# Patient Record
Sex: MALE | Race: OTHER | Employment: STUDENT | URBAN - METROPOLITAN AREA
[De-identification: names, ages, dates, MRNs, and addresses within clinical notes are randomized per-mention and may not be internally consistent; named-entity substitution may affect disease eponyms.]

---
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
## 2021-01-06 ENCOUNTER — TELEPHONE (OUTPATIENT)
Dept: ORTHOPEDIC SURGERY | Age: 21
End: 2021-01-06

## 2021-01-06 ENCOUNTER — APPOINTMENT (OUTPATIENT)
Dept: ULTRASOUND IMAGING | Age: 21
End: 2021-01-06
Payer: MEDICAID

## 2021-01-06 ENCOUNTER — HOSPITAL ENCOUNTER (OUTPATIENT)
Dept: WOUND CARE | Age: 21
Discharge: HOME | End: 2021-01-06
Payer: MEDICAID

## 2021-01-06 ENCOUNTER — HOSPITAL ENCOUNTER (EMERGENCY)
Age: 21
Discharge: HOME | End: 2021-01-06
Payer: MEDICAID

## 2021-01-06 VITALS
HEART RATE: 99 BPM | TEMPERATURE: 97.3 F | HEIGHT: 72 IN | OXYGEN SATURATION: 98 % | WEIGHT: 200 LBS | BODY MASS INDEX: 27.09 KG/M2 | RESPIRATION RATE: 16 BRPM | SYSTOLIC BLOOD PRESSURE: 125 MMHG | DIASTOLIC BLOOD PRESSURE: 86 MMHG

## 2021-01-06 VITALS
HEIGHT: 72 IN | WEIGHT: 197 LBS | HEART RATE: 84 BPM | BODY MASS INDEX: 26.68 KG/M2 | RESPIRATION RATE: 20 BRPM | TEMPERATURE: 97.4 F | SYSTOLIC BLOOD PRESSURE: 130 MMHG | DIASTOLIC BLOOD PRESSURE: 82 MMHG

## 2021-01-06 DIAGNOSIS — R30.0 DYSURIA: ICD-10-CM

## 2021-01-06 DIAGNOSIS — L98.492 ULCER OF ABDOMEN WALL WITH FAT LAYER EXPOSED (HCC): ICD-10-CM

## 2021-01-06 DIAGNOSIS — T86.828 SKIN FLAP NECROSIS (HCC): Chronic | ICD-10-CM

## 2021-01-06 DIAGNOSIS — T81.32XS DISRUPTION OR DEHISCENCE OF CLOSURE OF MUSCLE OR MUSCLE FLAP, SEQUELA: Primary | Chronic | ICD-10-CM

## 2021-01-06 DIAGNOSIS — N50.82 SCROTAL PAIN: Primary | ICD-10-CM

## 2021-01-06 DIAGNOSIS — I96 SKIN FLAP NECROSIS (HCC): Chronic | ICD-10-CM

## 2021-01-06 LAB
ALBUMIN SERPL-MCNC: 4.4 G/DL (ref 3.5–5.2)
ALP BLD-CCNC: 122 U/L (ref 40–129)
ALT SERPL-CCNC: 14 U/L (ref 0–40)
ANION GAP SERPL CALCULATED.3IONS-SCNC: 11 MMOL/L (ref 7–16)
AST SERPL-CCNC: 11 U/L (ref 0–39)
BASOPHILS ABSOLUTE: 0.04 E9/L (ref 0–0.2)
BASOPHILS RELATIVE PERCENT: 0.7 % (ref 0–2)
BILIRUB SERPL-MCNC: 0.2 MG/DL (ref 0–1.2)
BILIRUBIN URINE: NEGATIVE
BLOOD, URINE: NEGATIVE
BUN BLDV-MCNC: 10 MG/DL (ref 6–20)
CALCIUM SERPL-MCNC: 11 MG/DL (ref 8.6–10.2)
CHLORIDE BLD-SCNC: 101 MMOL/L (ref 98–107)
CLARITY: CLEAR
CO2: 25 MMOL/L (ref 22–29)
COLOR: YELLOW
CREAT SERPL-MCNC: 0.8 MG/DL (ref 0.7–1.2)
EOSINOPHILS ABSOLUTE: 0.17 E9/L (ref 0.05–0.5)
EOSINOPHILS RELATIVE PERCENT: 3 % (ref 0–6)
GFR AFRICAN AMERICAN: >60
GFR NON-AFRICAN AMERICAN: >60 ML/MIN/1.73
GLUCOSE BLD-MCNC: 89 MG/DL (ref 74–99)
GLUCOSE URINE: NEGATIVE MG/DL
HCT VFR BLD CALC: 36.6 % (ref 37–54)
HEMOGLOBIN: 12.5 G/DL (ref 12.5–16.5)
IMMATURE GRANULOCYTES #: 0.05 E9/L
IMMATURE GRANULOCYTES %: 0.9 % (ref 0–5)
KETONES, URINE: NEGATIVE MG/DL
LEUKOCYTE ESTERASE, URINE: NEGATIVE
LYMPHOCYTES ABSOLUTE: 2.13 E9/L (ref 1.5–4)
LYMPHOCYTES RELATIVE PERCENT: 38 % (ref 20–42)
MCH RBC QN AUTO: 29.8 PG (ref 26–35)
MCHC RBC AUTO-ENTMCNC: 34.2 % (ref 32–34.5)
MCV RBC AUTO: 87.4 FL (ref 80–99.9)
MONOCYTES ABSOLUTE: 0.52 E9/L (ref 0.1–0.95)
MONOCYTES RELATIVE PERCENT: 9.3 % (ref 2–12)
NEUTROPHILS ABSOLUTE: 2.69 E9/L (ref 1.8–7.3)
NEUTROPHILS RELATIVE PERCENT: 48.1 % (ref 43–80)
NITRITE, URINE: NEGATIVE
PDW BLD-RTO: 13.8 FL (ref 11.5–15)
PH UA: 5.5 (ref 5–9)
PLATELET # BLD: 263 E9/L (ref 130–450)
PMV BLD AUTO: 9 FL (ref 7–12)
POTASSIUM REFLEX MAGNESIUM: 4.2 MMOL/L (ref 3.5–5)
PROTEIN UA: NEGATIVE MG/DL
RBC # BLD: 4.19 E12/L (ref 3.8–5.8)
SODIUM BLD-SCNC: 137 MMOL/L (ref 132–146)
SPECIFIC GRAVITY UA: 1.02 (ref 1–1.03)
TOTAL PROTEIN: 7.1 G/DL (ref 6.4–8.3)
UROBILINOGEN, URINE: 0.2 E.U./DL
WBC # BLD: 5.6 E9/L (ref 4.5–11.5)

## 2021-01-06 PROCEDURE — 11042 DBRDMT SUBQ TIS 1ST 20SQCM/<: CPT

## 2021-01-06 PROCEDURE — 76870 US EXAM SCROTUM: CPT

## 2021-01-06 PROCEDURE — 87491 CHLMYD TRACH DNA AMP PROBE: CPT

## 2021-01-06 PROCEDURE — 93976 VASCULAR STUDY: CPT

## 2021-01-06 PROCEDURE — 11042 DBRDMT SUBQ TIS 1ST 20SQCM/<: CPT | Performed by: SURGERY

## 2021-01-06 PROCEDURE — 87088 URINE BACTERIA CULTURE: CPT

## 2021-01-06 PROCEDURE — 87591 N.GONORRHOEAE DNA AMP PROB: CPT

## 2021-01-06 PROCEDURE — 85025 COMPLETE CBC W/AUTO DIFF WBC: CPT

## 2021-01-06 PROCEDURE — 81003 URINALYSIS AUTO W/O SCOPE: CPT

## 2021-01-06 PROCEDURE — 99284 EMERGENCY DEPT VISIT MOD MDM: CPT

## 2021-01-06 PROCEDURE — 80053 COMPREHEN METABOLIC PANEL: CPT

## 2021-01-06 RX ORDER — LIDOCAINE HYDROCHLORIDE 40 MG/ML
SOLUTION TOPICAL ONCE
Status: DISCONTINUED | OUTPATIENT
Start: 2021-01-06 | End: 2021-01-07 | Stop reason: HOSPADM

## 2021-01-06 ASSESSMENT — PAIN SCALES - GENERAL: PAINLEVEL_OUTOF10: 7

## 2021-01-06 ASSESSMENT — PAIN DESCRIPTION - ORIENTATION: ORIENTATION: LEFT

## 2021-01-06 ASSESSMENT — PAIN DESCRIPTION - PAIN TYPE: TYPE: ACUTE PAIN

## 2021-01-06 ASSESSMENT — PAIN DESCRIPTION - LOCATION: LOCATION: SCROTUM

## 2021-01-06 NOTE — PROGRESS NOTES
[facility] Followup Visit Note    Referring Physician : MD Keegan Matthewspsy Jorge RECORD NUMBER:  [id number]  AGE: 24 y.o. GENDER: male  : 2000  EPISODE DATE:  2021    Subjective:     Chief Complaint   Patient presents with    Wound Check     abdomen/thigh      HISTORY of PRESENT ILLNESS HPI   Yogesh Aguilar is a 24 y.o. male who presents today in regards to follow up evaluation and treatment of wound/ulcer. That patient's past medical, family and social hx were reviewed and changes were made if present. History of Wound Context:  Patient presents in regards to chronic left lower extremity and abdominal wounds. He was a trauma and on admission underwent ex lap, control of mesenteric bleed with small bowel resection, gastrotomy. He also underwent multiple orthopaedic procedures, irrigation, debridements on adission. He had a traumatic amputation of his left leg, large traumatic lateral and medial thigh wound, a left open subtrochanteric femur fx and a left knee traumatic arthrotomy in addition to multiple other injuries. He  Eventually had IMN of the left femur and a complex secondary closure of his lateral left thigh wound, muscle flap. This muscle flap failed and he had a large open wound noted in lateral thigh which tracked underneath the left femur to the medial aspect of the left. He underwent hbo. He had skin graft by Dr. Mert Funes to left thigh with almost 100% take. 12/10/20 he had removal of his external fixator by Dr. Jann Juarez.       20  · Overall wound improving  · aquacell  ·  ordered through [facility]  21  · Wound improving  · Complaining of left scrotal pain for 1 week with dyuria - spoke with ER - he will go there to be evaluated    Wound/Ulcer Pain Timing/Severity: waxing and waning  Quality of pain: aching, shooting, tender  Severity:   10   Modifying Factors: Pain worsens with pressure, debridement  Associated Signs/Symptoms: drainage and pain    Ulcer Identification:  Ulcer Type: non-healing surgical and traumatic  Contributing Factors: decreased mobility    Diabetic/Pressure/Non Pressure Ulcers only:  Ulcer: Non-Pressure ulcer, fat layer exposed    Wound: Dehiscence of traumatic injury wound repair        PAST MEDICAL HISTORY      Diagnosis Date    Disruption of closure of muscle or muscle flap 10/9/2020    Encounter regarding vascular access for dialysis for ESRD ([facility] [address] 9/15/2020    History of blood transfusion     S/P AKA (above knee amputation), left ([facility] [address] 12/30/2020    Skin flap necrosis ([facility] [address] 10/21/2020    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/6/2020     Past Surgical History:   Procedure Laterality Date    APPLY DRESSING Left 9/17/2020    LEFT LOWER EXTREMITY I & D performed by Suyapa Garcia MD at [facility] N/A 10/13/2020    TESIO CATHETER REMOVAL---PT ON SELECT performed by Tasha Lindsey MD at [address] [place] [address] Left 9/3/2020    INCISION AND DRAINAGE LEFT LOWER LEG WITH REVISION OF BELOW THE KNEE AMPUTATION AND APPLICATION OF WOUND VAC performed by Suyapa Garcia MD at [facility] [facility] N/A 9/15/2020    CATHETER INSERTION HEMODIALYSIS TUNNELED REMOVAL OF TEMP DIALYSIS CATH performed by Tasha Lindsey MD at [address] [place] [address] Right 9/28/2020    LEFT THIGH IRRIGATION AND DEBRIDEMENT, RIGHT SI SCREW, REVISION OF PELVIC EXTERNAL FIXATOR performed by Yaa Jensen DO at [address] [place] [address] Left 10/5/2020    IRRIGATION AND DEBRIDEMENT LEFT THIGH COMPLEX POST OP WOUND WITH APPLICATION WOUND VAC performed by Yaa Jensen DO at [address] [place] [address] Left 10/8/2020    IRRIGATION AND DEBRIDEMENT LEFT THIGH WOUND GREATER THAN 20 SQUARE CM performed by Suyapa Garcia MD at [facility] 74 N/A 8/29/2020    exploratory laporotomy, small bowel resection, abdominal packing performed by Wayne Daniel MD at [facility] 9/3/2020    2nd LOOK LAPAROTOMY, WITH  ABDOMINAL WOUND CLOSURE performed by Andreea Jacobs MD at [address] [place] [address] 8/29/2020    TRAUMATIC LEFT LEG AMPUTATION BELOW KNEE, IRRIGATION AND DEBRIDEMENT LEFT KNEE, MEDIAL LEFT THIGH WOUND, PARTIAL AMPUTATION LEFT BKA, WOUND VAC APPLICATION LEFT KNEE, THIGH, TRACTION PIN LEFT FEMUR performed by Enriqueta Galeas MD at [address] [place] [address] 9/8/2020    THIGH IRRIGATION AND DEBRIDEMENT, APPLICATION WOUND VAC, IM NAIL LEFT FEMUR performed by Nalini Serrano MD at [facility] 10/20/2020    SKIN GRAFT SPLIT THICKNESS TO UPPER LEFT THIGH WOUND WITH APPLICATION OF WOUND VAC-----PT IN [facility] ----- performed by Naima Allred MD at [place] [address] N/A 9/10/2020    TRACHEOTOMY PERCUTANEOUS BRONCHOSCOPY performed by Mushtaq Lara MD at [facility] N/A 9/10/2020    EGD ESOPHAGOGASTRODUODENOSCOPY PEG TUBE INSERTION performed by Mushtaq Lara MD at [address] [place] [address]     History reviewed. No pertinent family history.   Social History     Tobacco Use    Smoking status: Never Smoker    Smokeless tobacco: Never Used   Substance Use Topics    Alcohol use: Never     Frequency: Never    Drug use: Never     No Known Allergies  Current Outpatient Medications on File Prior to Encounter   Medication Sig Dispense Refill    calcium acetate (PHOSLYRA) 667 MG/5ML SOLN Take 1,334 mg by mouth 3 times daily (with meals)      metoclopramide (REGLAN) 10 MG tablet Take 10 mg by mouth 4 times daily      vitamin D (CHOLECALCIFEROL) 14156 UNIT CAPS Take 50,000 Units by mouth once a week      magnesium oxide (MAG-OX) 400 MG tablet Take 800 mg by mouth 2 times daily       ferrous sulfate (FE TABS 325) 325 (65 Fe) MG EC tablet Take 325 mg by mouth 3 times daily (with meals)      fluticasone (FLONASE) 50 MCG/ACT nasal spray 1 spray by Each Nostril route daily      amitriptyline (ELAVIL) 25 MG tablet 25 mg by PEG Tube route nightly      methocarbamol (ROBAXIN) 500 MG tablet Take 1,000 mg by mouth 3 times daily       gabapentin (NEURONTIN) 100 MG capsule Take 1 capsule by mouth 3 times daily for 30 days. (Patient taking differently: 600 mg by PEG Tube route 3 times daily. ) 90 capsule 0    cloNIDine (CATAPRES) 0.1 MG tablet Take 1 tablet by mouth 2 times daily (Patient taking differently: 0.1 mg by PEG Tube route daily ) 60 tablet 3    Misc. Devices [facility]) MISC 20\" seat width. Diagnosis: traumatic amputation of left lower extremity, left femur fracture, pelvis fractures with disruption of pelvic ring 1 each 0    acetaminophen (TYLENOL) 325 MG tablet 650 mg by PEG Tube route every 6 hours as needed for Pain       No current facility-administered medications on file prior to encounter. REVIEW OF SYSTEMS See HPI    Objective:    /82   Pulse 84   Temp 97.4 °F (36.3 °C) (Temporal)   Resp 20   Ht 6' (1.829 m)   Wt 197 lb (89.4 kg)   BMI 26.72 kg/m²   Wt Readings from Last 3 Encounters:   01/06/21 197 lb (89.4 kg) (92 %, Z= 1.38)*   12/30/20 197 lb (89.4 kg) (92 %, Z= 1.39)*   10/05/20 (!) 236 lb 12.4 oz (107.4 kg) (99 %, Z= 2.21)*     * Growth percentiles are based on CDC (Boys, 2-20 Years) data.      PHYSICAL EXAM  CONSTITUTIONAL:   Awake, alert, cooperative   EYES:  lids and lashes normal   ENT: external ears and nose without lesions   NECK:  supple, symmetrical, trachea midline   SKIN:  Open wound present    Assessment:     Problem List Items Addressed This Visit     Disruption of closure of muscle or muscle flap - Primary (Chronic)    Skin flap necrosis (HCC) (Chronic)    Ulcer of abdomen wall with fat layer exposed ([address]          Pre Debridement Measurements:  Are located in the [place]  Documentation Flow Sheet  Post Debridement Measurements:  Wound/Ulcer Descriptions are Pre Debridement except measurements:     Negative Pressure Wound Therapy Leg Left;Upper; Lateral (Active)   Dressing Type Black foam 12/10/20 0820   Cycle Continuous 12/10/20 0820   Target Pressure (mmHg) 125 12/10/20 0820   Number of days: 92       Negative Pressure Wound Therapy Leg Anterior; Upper;Left (Active)   Number of days: 77       Wound 08/29/20 Thigh Left;Medial (Active)   Number of days: 130       Wound 08/29/20 Leg Left;Distal (Active)   Number of days: 130       Wound 09/03/20 Abdomen second distal (Active)   Number of days: 125       Wound 09/08/20 Abdomen most proximal (Active)   Number of days: 120       Wound 09/03/20 Abdomen second proximal (Active)   Number of days: 125       Wound 09/03/20 Abdomen third most proximal (Active)   Number of days: 125       Wound 09/03/20 Abdomen forth most proximal (Active)   Number of days: 125       Wound 09/03/20 Abdomen most distal (Active)   Number of days: 125       Wound 09/03/20 Abdomen third distal (Active)   Number of days: 125       Wound 09/03/20 Abdomen forth distal (Active)   Number of days: 125       Wound 09/10/20 Wrist Right;Medial (Active)   Number of days: 117       Wound 09/08/20 Leg Left;Distal;Upper (Active)   Number of days: 120       Wound 10/20/20 Thigh Left; Anterior SKIN GRAFT SITE (Active)   Number of days: 78       Wound 12/30/20 Thigh Left #1 (Active)   Wound Image   12/30/20 0807   Wound Etiology Traumatic 12/30/20 0807   Dressing/Treatment Collagen;Dry dressing 01/06/21 0844   Wound Length (cm) 1.7 cm 01/06/21 0803   Wound Width (cm) 0.4 cm 01/06/21 0803   Wound Depth (cm) 0.5 cm 01/06/21 0803   Wound Surface Area (cm^2) 0.68 cm^2 01/06/21 0803   Change in Wound Size % (l*w) 74.24 01/06/21 0803   Wound Volume (cm^3) 0.34 cm^3 01/06/21 0803   Wound Healing % 74 01/06/21 0803   Post-Procedure Length (cm) 1.9 cm 01/06/21 0844   Post-Procedure Width (cm) 0.7 cm 01/06/21 0844   Post-Procedure Depth (cm) 0.6 cm 01/06/21 0844   Post-Procedure Surface Area (cm^2) 1.33 cm^2 01/06/21 0844   Post-Procedure Volume (cm^3) 0.8 cm^3 01/06/21 0844   Wound Assessment Fibrin 01/06/21 0803   Drainage Amount Moderate 01/06/21 0803   Drainage Description Yellow;Brown 01/06/21 0803   Odor None 01/06/21 0803   Scarlet-wound Assessment Maceration 01/06/21 0803   Number of days: 7       Wound 12/30/20 Abdomen Lower;Medial #2 (Active)   Wound Image   12/30/20 0807   Wound Etiology Traumatic 12/30/20 0807   Dressing/Treatment Collagen;Dry dressing 01/06/21 0844   Wound Length (cm) 1.2 cm 01/06/21 0803   Wound Width (cm) 2.3 cm 01/06/21 0803   Wound Depth (cm) 0.2 cm 01/06/21 0803   Wound Surface Area (cm^2) 2.76 cm^2 01/06/21 0803   Change in Wound Size % (l*w) -6800 01/06/21 0803   Wound Volume (cm^3) 0.55 cm^3 01/06/21 0803   Wound Healing % -5400 01/06/21 0803   Post-Procedure Length (cm) 1.5 cm 01/06/21 0844   Post-Procedure Width (cm) 2.4 cm 01/06/21 0844   Post-Procedure Depth (cm) 0.2 cm 01/06/21 0844   Post-Procedure Surface Area (cm^2) 3.6 cm^2 01/06/21 0844   Post-Procedure Volume (cm^3) 0.72 cm^3 01/06/21 0844   Wound Assessment Pink/red;Fibrin 01/06/21 0803   Drainage Amount Moderate 01/06/21 0803   Drainage Description Yellow 01/06/21 0803   Odor None 01/06/21 0803   Scarlet-wound Assessment Intact 01/06/21 0803   Number of days: 7     Incision 08/29/20 Hip Left (Active)   Number of days: 129       Incision 09/03/20 Abdomen Mid (Active)   Number of days: 124       Incision 09/11/20 Thigh Left;Lateral (Active)   Number of days: 116       Incision 09/15/20 Chest Right;Upper (Active)   Number of days: 112       Incision 09/17/20 Thigh Left (Active)   Number of days: 110       Incision 09/28/20 Pelvis Lateral;Right;Left (Active)   Number of days: 100       Incision 09/28/20 Pelvis Anterior (Active)   Number of days: 100       Incision 09/28/20 Thigh Left (Active)   Number of days: 100       Incision 10/05/20 Thigh Left;Lateral (Active)   Number of days: 92       Incision 10/08/20 Thigh Left (Active)   Number of days: 89       Incision 10/20/20 Thigh Right; Anterior (Active)   Number of days: 78       Incision 12/10/20 Abdomen Left (Active)   Dressing Status Clean;Dry; Intact; New drainage noted 12/10/20 0930   Dressing/Treatment Dry dressing 12/10/20 0930   Drainage Amount Scant 12/10/20 0930   Drainage Description Serosanguinous 12/10/20 0930   Number of days: 27       Incision 12/10/20 Anterior;Right (Active)   Dressing Status Clean;Dry; Intact; New drainage noted 12/10/20 0930   Dressing/Treatment Dry dressing 12/10/20 0930   Drainage Amount Scant 12/10/20 0900   Drainage Description Serosanguinous 12/10/20 0900   Number of days: 27       Procedure Note  Indications:  Based on my examination of this patient's wound(s)/ulcer(s) today, debridement is required to promote healing and evaluate the wound base. Performed by: Maria Esther Bailey MD    Consent obtained:  Yes    Time out taken:  Yes    Pain Control: Anesthetic  Anesthetic: 4% Lidocaine Liquid Topical     Debridement:Excisional Debridement    Using curette the wound(s)/ulcer(s) was/were sharply debrided down through and including the removal of epidermis, dermis and subcutaneous tissue. Devitalized Tissue Debrided:  fibrin, biofilm, slough, necrotic/eschar and exudate to stimulate bleeding to promote healing, post debridement good bleeding base and wound edges noted    Wound/Ulcer #: 1 and 2    Percent of Wound/Ulcer Debrided: 100%    Total Surface Area Debrided: 3.3 sq cm     Estimated Blood Loss:  Minimal  Hemostasis Achieved:  by pressure    Procedural Pain:  4  / 10   Post Procedural Pain:  3 / 10     Response to treatment:  Well tolerated by patient. Plan:   Treatment Note please see attached Discharge Instructions    Written patient dismissal instructions given to patient and signed by patient or POA.          Discharge Instructions       Visit Discharge/Physician Orders     Discharge condition: Stable     Assessment of pain at discharge:no     Anesthetic used: 4% lidocaine soln.     Discharge to: Home     Left via:Private automobile     Accompanied by: cousin     ECF/HHA: n/a(supplies [facility])     Dressing Orders: LEFT POSTERIOR THIGH and ABDOMEN-Cleanse with normal saline, apply promogran(slightly moisten with normal saline),dry dressing and secure. Change daily.     Treatment Orders:Eat a diet high in protein and vitamin C. Take a multiple vitamin daily unless contraindicated.     Keep all pressure off of wound sites       orthotics consult was made by Dr Jc Seaman     [facility] followup visit : 1 week_____________________________  (Please note your next appointment above and if you are unable to keep, kindly give a 24 hour notice.  Thank you.)     Physician signature:__________________________     If you experience any of the following, please call the [address] [place] [address] during business hours:     * Increase in Pain  * Temperature over 101  * Increase in drainage from your wound  * Drainage with a foul odor  * Bleeding  * Increase in swelling  * Need for compression bandage changes due to slippage, breakthrough drainage.     If you need medical attention outside of the business hours of the [address] [place] [address] CLEAR please contact your PCP or go to the nearest emergency room.                 Electronically signed by Geovanni Lakhani MD on 1/6/2021 at 9:19 AM

## 2021-01-06 NOTE — TELEPHONE ENCOUNTER
This is done as of 8 am this morning  Electronically signed by Valentin Fuentes PA-C on 1/6/2021 at 1:31 PM

## 2021-01-06 NOTE — TELEPHONE ENCOUNTER
Received voicemail from Omayra @ [address] [facility] #[phone]. Order for wheelchair with elevated leg rests was ordered on 12- by Emely Garcia PA-C. Omyara is stating she has been unable to finish processing the request due to 12/30/2020 note needing signed in Epic (she did not say if it's the order for [facility], [facility] order or OV notes from 12- need signed). Asking for this to be signed so she can finish processing. No need to fax it since Omayra can pull it from [employer].

## 2021-01-06 NOTE — ED NOTES
FIRST PROVIDER CONTACT ASSESSMENT NOTE      Department of Emergency Medicine   1/6/21  9:30 AM EST    Chief Complaint: Groin Swelling (left testicle pain)      History of Present Illness:   Hazel Bower is a 24 y.o. male who presents to the ED for testicle pain for 1 week with difficulty urinating. Medical History:  has a past medical history of Disruption of closure of muscle or muscle flap, Encounter regarding vascular access for dialysis for ESRD ([facility] [address] History of blood transfusion, S/P AKA (above knee amputation), left ([address] Skin flap necrosis ([facility] [address] and Skin graft failure. Surgical History:  has a past surgical history that includes laparotomy (N/A, 8/29/2020); Leg amputation below knee (Left, 8/29/2020); laparotomy (N/A, 9/3/2020); Femur fracture surgery (Left, 9/3/2020); Leg amputation below knee (Left, 9/8/2020); Tracheal surgery (N/A, 9/10/2020); Upper gastrointestinal endoscopy (N/A, 9/10/2020); hc dialysis catheter (N/A, 9/15/2020); Apply dressing (Left, 9/17/2020); incision and drainage (Right, 9/28/2020); incision and drainage (Left, 10/5/2020); incision and drainage (Left, 10/8/2020); Catheter Removal (N/A, 10/13/2020); and Skin graft (Left, 10/20/2020). Social History:  reports that he has never smoked. He has never used smokeless tobacco. He reports that he does not drink alcohol or use drugs. Family History: family history is not on file. *ALLERGIES*     Patient has no known allergies.      Physical Exam:      VS:  /86   Pulse 99   Temp 97.3 °F (36.3 °C)   Resp 16   Ht 6' (1.829 m)   Wt 200 lb (90.7 kg)   SpO2 98%   BMI 27.12 kg/m²      Initial Plan of Care:  Initiate Treatment-Testing, Proceed toTreatment Area When Bed Available for ED Attending/MLP to Continue Care    -----------------[address] ASSESSMENT NOTE--------------  Electronically signed by Mardene Curling, APRN - CNP   DD: 1/6/21           Mardene Curling, APRN - CNP  01/06/21 0930

## 2021-01-07 NOTE — ED PROVIDER NOTES
tobacco. He reports that he does not drink alcohol or use drugs. Family History: family history is not on file. Allergies: Patient has no known allergies. Physical Exam   Oxygen Saturation Interpretation: Normal.        ED Triage Vitals   BP Temp Temp src Heart Rate Resp SpO2 Height Weight - Scale   01/06/21 0928 01/06/21 0909 -- 01/06/21 0909 01/06/21 0928 01/06/21 0909 01/06/21 0928 01/06/21 0928   125/86 97.3 °F (36.3 °C)  97 16 97 % 6' (1.829 m) 200 lb (90.7 kg)         Constitutional:  Alert, development consistent with age. Eyes:  PERRL, EOMI, no discharge or conjunctival injection. Ears:  External ears without lesions. Throat:  Pharynx without injection, exudate, or tonsillar hypertrophy. Airway patient. Neck:  Normal ROM. Supple. Respiratory:  Clear to auscultation and breath sounds equal.  CV:  Regular rate and rhythm, normal heart sounds, without pathological murmurs, ectopy, gallops, or rubs. GI:  General Appearance: normal.       Bowel sounds: normal bowel sounds. Distension:  None. Tenderness: No abdominal tenderness. Liver: non-tender. Spleen:  non-tender. Pulsatile Mass: absent. Hernia:  no inguinal or femoral hernias noted. : Chaperone present during exam       Penis: non focal circumcised. Scrotum: normal.         Testicle: normal without masses bilateral.  Integument:  Normal turgor. Warm, dry, without visible rash, unless noted elsewhere. Neurological:  Orientation age-appropriate. Motor functions intact.     Lab / Imaging Results   (All laboratory and radiology results have been personally reviewed by myself)  Labs:  Results for orders placed or performed during the hospital encounter of 01/06/21   C.trachomatis N.gonorrhoeae DNA, Urine    Specimen: Urine   Result Value Ref Range    Source Urine    CBC Auto Differential   Result Value Ref Range    WBC 5.6 4.5 - 11.5 E9/L    RBC 4.19 3.80 - 5.80 E12/L Hemoglobin 12.5 12.5 - 16.5 g/dL    Hematocrit 36.6 (L) 37.0 - 54.0 %    MCV 87.4 80.0 - 99.9 fL    MCH 29.8 26.0 - 35.0 pg    MCHC 34.2 32.0 - 34.5 %    RDW 13.8 11.5 - 15.0 fL    Platelets 079 760 - 663 E9/L    MPV 9.0 7.0 - 12.0 fL    Neutrophils % 48.1 43.0 - 80.0 %    Immature Granulocytes % 0.9 0.0 - 5.0 %    Lymphocytes % 38.0 20.0 - 42.0 %    Monocytes % 9.3 2.0 - 12.0 %    Eosinophils % 3.0 0.0 - 6.0 %    Basophils % 0.7 0.0 - 2.0 %    Neutrophils Absolute 2.69 1.80 - 7.30 E9/L    Immature Granulocytes # 0.05 E9/L    Lymphocytes Absolute 2.13 1.50 - 4.00 E9/L    Monocytes Absolute 0.52 0.10 - 0.95 E9/L    Eosinophils Absolute 0.17 0.05 - 0.50 E9/L    Basophils Absolute 0.04 0.00 - 0.20 E9/L   Comprehensive Metabolic Panel w/ Reflex to MG   Result Value Ref Range    Sodium 137 132 - 146 mmol/L    Potassium reflex Magnesium 4.2 3.5 - 5.0 mmol/L    Chloride 101 98 - 107 mmol/L    CO2 25 22 - 29 mmol/L    Anion Gap 11 7 - 16 mmol/L    Glucose 89 74 - 99 mg/dL    BUN 10 6 - 20 mg/dL    CREATININE 0.8 0.7 - 1.2 mg/dL    GFR Non-African American >60 >=60 mL/min/1.73    GFR African American >60     Calcium 11.0 (H) 8.6 - 10.2 mg/dL    Total Protein 7.1 6.4 - 8.3 g/dL    Alb 4.4 3.5 - 5.2 g/dL    Total Bilirubin 0.2 0.0 - 1.2 mg/dL    Alkaline Phosphatase 122 40 - 129 U/L    ALT 14 0 - 40 U/L    AST 11 0 - 39 U/L   Urinalysis, reflex to microscopic   Result Value Ref Range    Color, UA Yellow Straw/Yellow    Clarity, UA Clear Clear    Glucose, Ur Negative Negative mg/dL    Bilirubin Urine Negative Negative    Ketones, Urine Negative Negative mg/dL    Specific Gravity, UA 1.025 1.005 - 1.030    Blood, Urine Negative Negative    pH, UA 5.5 5.0 - 9.0    Protein, UA Negative Negative mg/dL    Urobilinogen, Urine 0.2 <2.0 E.U./dL    Nitrite, Urine Negative Negative    Leukocyte Esterase, Urine Negative Negative     Imaging: All Radiology results interpreted by Radiologist unless otherwise noted.   [address] [place] [address] TESTICLES   Final Result   Normal sonographic evaluation of the are right and left testicles/epididymis. No evidence for testicular torsion bilaterally. Discrete thickening of the scrotal wall to be correlated clinically,   presently nonspecific. US DUP ABD PEL RETRO SCROT LIMITED   Final Result   Normal sonographic evaluation of the are right and left testicles/epididymis. No evidence for testicular torsion bilaterally. Discrete thickening of the scrotal wall to be correlated clinically,   presently nonspecific. ED Course / Medical Decision Making   Medications - No data to display       Consults:   None    Procedures:   none    MDM:   Patient presenting with left scrotal pain and swelling for the past week. Patient has also had dysuria. Patient denies STD risk but was okay with me sending a GC urine. Patient is in no acute distress, afebrile, nontoxic in appearance. Patient's labs and urine are negative. Patient's ultrasound is negative for torsion or epididymitis. Patient's ultrasound did show discrete thickening of the scrotal wall on the left. On exam, patient had no swelling or tenderness. No masses palpated. No wounds, lesions, or erythematous areas noted. Recommended patient follow-up with PCP for resolution of symptoms and to get his chlamydia and gonorrhea results. Recommended patient return to the ED with new or worsening of symptoms. Plan of Care/Counseling:  I reviewed today's visit with the patient in addition to providing specific details for the plan of care and counseling regarding the diagnosis and prognosis. Questions are answered at this time and are agreeable with the plan. Assessment     1. Scrotal pain    2. Dysuria      Plan   Discharged home.   Patient condition is stable    New Medications     Discharge Medication List as of 1/6/2021  5:13 PM        Electronically signed by Ronni Butt PA-C   DD: 1/7/21  **This report was transcribed using voice recognition software. Every effort was made to ensure accuracy; however, inadvertent computerized transcription errors may be present.   END OF ED PROVIDER NOTE     Ralph Parikh PA-C  01/07/21 0983

## 2021-01-08 LAB — URINE CULTURE, ROUTINE: NORMAL

## 2021-01-11 LAB
C. TRACHOMATIS DNA ,URINE: NEGATIVE
N. GONORRHOEAE DNA, URINE: NEGATIVE
SOURCE: NORMAL

## 2021-01-13 ENCOUNTER — HOSPITAL ENCOUNTER (OUTPATIENT)
Dept: WOUND CARE | Age: 21
Discharge: HOME | End: 2021-01-13
Payer: MEDICAID

## 2021-01-13 VITALS
TEMPERATURE: 99.7 F | HEART RATE: 100 BPM | SYSTOLIC BLOOD PRESSURE: 132 MMHG | RESPIRATION RATE: 20 BRPM | DIASTOLIC BLOOD PRESSURE: 90 MMHG

## 2021-01-13 DIAGNOSIS — T81.32XS DISRUPTION OR DEHISCENCE OF CLOSURE OF MUSCLE OR MUSCLE FLAP, SEQUELA: Chronic | ICD-10-CM

## 2021-01-13 DIAGNOSIS — I96 SKIN FLAP NECROSIS (HCC): Chronic | ICD-10-CM

## 2021-01-13 DIAGNOSIS — Z89.612 S/P AKA (ABOVE KNEE AMPUTATION), LEFT (HCC): Primary | ICD-10-CM

## 2021-01-13 DIAGNOSIS — T86.828 SKIN FLAP NECROSIS (HCC): Chronic | ICD-10-CM

## 2021-01-13 DIAGNOSIS — L98.492 ULCER OF ABDOMEN WALL WITH FAT LAYER EXPOSED (HCC): ICD-10-CM

## 2021-01-13 PROCEDURE — 11042 DBRDMT SUBQ TIS 1ST 20SQCM/<: CPT | Performed by: SURGERY

## 2021-01-13 PROCEDURE — 11042 DBRDMT SUBQ TIS 1ST 20SQCM/<: CPT

## 2021-01-13 ASSESSMENT — PAIN SCALES - GENERAL: PAINLEVEL_OUTOF10: 0

## 2021-01-14 ENCOUNTER — TELEPHONE (OUTPATIENT)
Dept: ADMINISTRATIVE | Age: 21
End: 2021-01-14

## 2021-01-14 NOTE — TELEPHONE ENCOUNTER
OK to push appointment to end of January, he is progressive WB, was referred for prosthesis, also ordered UE EMG/NCT. Patient does not have EMG yet scheduled. We will need to follow up on this as well.   Electronically signed by Sahara Burdick PA-C on 1/14/2021 at 2:59 PM

## 2021-01-14 NOTE — TELEPHONE ENCOUNTER
Patients brother calling wanting to rescedule reschedule his PO appt scheduled for next Monday 1/25. Not available Tuesday 26th and is availbale next Wednesday 27th after 10am. Or any time the following week.

## 2021-01-19 NOTE — PROGRESS NOTES
[facility] Followup Visit Note    Referring Physician : MD Keegan Robert Mckenzie Patel RECORD NUMBER:  [id number]  AGE: 24 y.o. GENDER: male  : 2000  EPISODE DATE:  2021    Subjective:     Chief Complaint   Patient presents with    Wound Check     lt thigh       HISTORY of PRESENT ILLNESS HPI   Shay Lopez is a 24 y.o. male who presents today in regards to follow up evaluation and treatment of wound/ulcer. That patient's past medical, family and social hx were reviewed and changes were made if present. History of Wound Context:  Patient presents in regards to chronic left lower extremity and abdominal wounds. He was a trauma and on admission underwent ex lap, control of mesenteric bleed with small bowel resection, gastrotomy. He also underwent multiple orthopaedic procedures, irrigation, debridements on adission. He had a traumatic amputation of his left leg, large traumatic lateral and medial thigh wound, a left open subtrochanteric femur fx and a left knee traumatic arthrotomy in addition to multiple other injuries. He  Eventually had IMN of the left femur and a complex secondary closure of his lateral left thigh wound, muscle flap. This muscle flap failed and he had a large open wound noted in lateral thigh which tracked underneath the left femur to the medial aspect of the left. He underwent hbo. He had skin graft by Dr. Antonella Jackson to left thigh with almost 100% take. 12/10/20 he had removal of his external fixator by Dr. Len Logan.       20  · Overall wound improving  · aquacell  ·  ordered through [facility]  21  · Wound improving  · Complaining of left scrotal pain for 1 week with dyuria - spoke with ER - he will go there to be evaluated  21  · Wounds improving    Wound/Ulcer Pain Timing/Severity: waxing and waning  Quality of pain: aching, shooting, tender  Severity:  4 / 10   Modifying Factors: Pain worsens with pressure, debridement  Associated Signs/Symptoms: drainage and pain    Ulcer Identification:  Ulcer Type: non-healing surgical and traumatic  Contributing Factors: decreased mobility    Diabetic/Pressure/Non Pressure Ulcers only:  Ulcer: Non-Pressure ulcer, fat layer exposed    Wound: Dehiscence of traumatic injury wound repair        PAST MEDICAL HISTORY      Diagnosis Date    Disruption of closure of muscle or muscle flap 10/9/2020    Encounter regarding vascular access for dialysis for ESRD ([facility] [facility] 9/15/2020    History of blood transfusion     S/P AKA (above knee amputation), left ([facility] [facility] 12/30/2020    Skin flap necrosis ([facility] [facility] 10/21/2020    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/6/2020     Past Surgical History:   Procedure Laterality Date    APPLY DRESSING Left 9/17/2020    LEFT LOWER EXTREMITY I & D performed by Flor Bucio MD at [address] [place] [address] 10/13/2020    TESIO CATHETER REMOVAL---PT ON SELECT performed by Kristy Meier MD at [address] [place] [address] Left 9/3/2020    INCISION AND DRAINAGE LEFT LOWER LEG WITH REVISION OF BELOW THE KNEE AMPUTATION AND APPLICATION OF WOUND VAC performed by Flor Bucio MD at [address] [place] [address],[address] N/A 9/15/2020    CATHETER INSERTION HEMODIALYSIS TUNNELED REMOVAL OF TEMP DIALYSIS CATH performed by Kristy Meier MD at [address] [place] [address] Right 9/28/2020    LEFT THIGH IRRIGATION AND DEBRIDEMENT, RIGHT SI SCREW, REVISION OF PELVIC EXTERNAL FIXATOR performed by Talya Garcia DO at [address] [place] [address] Left 10/5/2020    IRRIGATION AND DEBRIDEMENT LEFT THIGH COMPLEX POST OP WOUND WITH APPLICATION WOUND VAC performed by Talya Garcia DO at [address] [place] [address] Left 10/8/2020    IRRIGATION AND DEBRIDEMENT LEFT THIGH WOUND GREATER THAN 20 SQUARE CM performed by Flor Bucio MD at [place] [address] N/A 8/29/2020    exploratory laporotomy, small bowel resection, tablet Take 800 mg by mouth 2 times daily       ferrous sulfate (FE TABS 325) 325 (65 Fe) MG EC tablet Take 325 mg by mouth 3 times daily (with meals)      amitriptyline (ELAVIL) 25 MG tablet Take 25 mg by mouth nightly       methocarbamol (ROBAXIN) 500 MG tablet Take 1,000 mg by mouth 3 times daily       acetaminophen (TYLENOL) 325 MG tablet 650 mg by PEG Tube route every 6 hours as needed for Pain      gabapentin (NEURONTIN) 100 MG capsule Take 1 capsule by mouth 3 times daily for 30 days. (Patient taking differently: 600 mg by PEG Tube route 3 times daily. ) 90 capsule 0    cloNIDine (CATAPRES) 0.1 MG tablet Take 1 tablet by mouth 2 times daily (Patient taking differently: Take 0.1 mg by mouth daily ) 60 tablet 3     No current facility-administered medications on file prior to encounter. REVIEW OF SYSTEMS See HPI    Objective:    BP (!) 132/90   Pulse 100   Temp 99.7 °F (37.6 °C) (Temporal) Comment: wearing a hat   Resp 20   Wt Readings from Last 3 Encounters:   01/06/21 200 lb (90.7 kg) (93 %, Z= 1.46)*   01/06/21 197 lb (89.4 kg) (92 %, Z= 1.38)*   12/30/20 197 lb (89.4 kg) (92 %, Z= 1.39)*     * Growth percentiles are based on [facility] (Boys, 2-20 Years) data. PHYSICAL EXAM  CONSTITUTIONAL:   Awake, alert, cooperative   EYES:  lids and lashes normal   ENT: external ears and nose without lesions   NECK:  supple, symmetrical, trachea midline   SKIN:  Open wound present    Assessment:     Problem List Items Addressed This Visit     Disruption of closure of muscle or muscle flap (Chronic)    Skin flap necrosis (HCC) (Chronic)    S/P AKA (above knee amputation), left (HCC) - Primary (Chronic)    Ulcer of abdomen wall with fat layer exposed ([facility] [address] [facility]          Pre Debridement Measurements:  Are located in the [place]  Documentation Flow Sheet  Post Debridement Measurements:  Wound/Ulcer Descriptions are Pre Debridement except measurements:     Negative Pressure Wound Therapy Leg Left;Upper; Lateral 0.64 cm^2 01/13/21 0943   Post-Procedure Volume (cm^3) 0.13 cm^3 01/13/21 0943   Wound Assessment Fibrin 01/13/21 0829   Drainage Amount Moderate 01/13/21 0829   Drainage Description Serous 01/13/21 0829   Odor None 01/13/21 0829   Scarlet-wound Assessment Intact 01/13/21 0829   Number of days: 20       Wound 12/30/20 Abdomen Lower;Medial #2 (Active)   Wound Image   12/30/20 0807   Wound Etiology Traumatic 12/30/20 0807   Dressing Status New dressing applied 01/13/21 0956   Wound Cleansed Cleansed with saline 01/13/21 0956   Dressing/Treatment Collagen 01/13/21 0956   Offloading for Diabetic Foot Ulcers Other (comment) 01/13/21 0956   Wound Length (cm) 1.2 cm 01/13/21 0829   Wound Width (cm) 0.3 cm 01/13/21 0829   Wound Depth (cm) 0.1 cm 01/13/21 0829   Wound Surface Area (cm^2) 0.36 cm^2 01/13/21 0829   Change in Wound Size % (l*w) -800 01/13/21 0829   Wound Volume (cm^3) 0.04 cm^3 01/13/21 0829   Wound Healing % -300 01/13/21 0829   Post-Procedure Length (cm) 1.3 cm 01/13/21 0943   Post-Procedure Width (cm) 1 cm 01/13/21 0943   Post-Procedure Depth (cm) 0.1 cm 01/13/21 0943   Post-Procedure Surface Area (cm^2) 1.3 cm^2 01/13/21 0943   Post-Procedure Volume (cm^3) 0.13 cm^3 01/13/21 0943   Wound Assessment Pale granulation tissue 01/13/21 0829   Drainage Amount Scant 01/13/21 0829   Drainage Description Serous 01/13/21 0829   Odor None 01/13/21 0829   Scarlet-wound Assessment Intact 01/13/21 0829   Number of days: 20     Incision 08/29/20 Hip Left (Active)   Number of days: 142       Incision 09/03/20 Abdomen Mid (Active)   Number of days: 138       Incision 09/11/20 Thigh Left;Lateral (Active)   Number of days: 130       Incision 09/15/20 Chest Right;Upper (Active)   Number of days: 125       Incision 09/17/20 Thigh Left (Active)   Number of days: 124       Incision 09/28/20 Pelvis Lateral;Right;Left (Active)   Number of days: 113       Incision 09/28/20 Pelvis Anterior (Active)   Number of days: 113       Incision 09/28/20 Thigh Left (Active)   Number of days: 113       Incision 10/05/20 Thigh Left;Lateral (Active)   Number of days: 106       Incision 10/08/20 Thigh Left (Active)   Number of days: 103       Incision 10/20/20 Thigh Right; Anterior (Active)   Number of days: 91       Incision 12/10/20 Abdomen Left (Active)   Number of days: 40       Incision 12/10/20 Anterior;Right (Active)   Number of days: 40       Procedure Note  Indications:  Based on my examination of this patient's wound(s)/ulcer(s) today, debridement is required to promote healing and evaluate the wound base. Performed by: Pawel Salinas MD    Consent obtained:  Yes    Time out taken:  Yes    Pain Control:       Debridement:Excisional Debridement    Using curette the wound(s)/ulcer(s) was/were sharply debrided down through and including the removal of epidermis, dermis and subcutaneous tissue. Devitalized Tissue Debrided:  fibrin, biofilm, slough, necrotic/eschar and exudate to stimulate bleeding to promote healing, post debridement good bleeding base and wound edges noted    Wound/Ulcer #: 1 and 2    Percent of Wound/Ulcer Debrided: 100%    Total Surface Area Debrided: 0.66 sq cm     Estimated Blood Loss:  Minimal  Hemostasis Achieved:  by pressure    Procedural Pain:  4  / 10   Post Procedural Pain:  3 / 10     Response to treatment:  Well tolerated by patient. Plan:   Treatment Note please see attached Discharge Instructions    Written patient dismissal instructions given to patient and signed by patient or POA.          Discharge Instructions       Visit Discharge/Physician Orders     Discharge condition: Stable     Assessment of pain at discharge:no     Anesthetic used: 4% lidocaine soln.     Discharge to: Home     Left via:Private automobile     Accompanied by: cousin     ECF/HHA: n/a(supplies [facility])     Dressing Orders: LEFT POSTERIOR THIGH and ABDOMEN-Cleanse with normal saline, apply promogran(slightly moisten with normal saline),dry dressing and secure. Change daily.     Treatment Orders:Eat a diet high in protein and vitamin C. Take a multiple vitamin daily unless contraindicated.     Keep all pressure off of wound sites       orthotics consult was made for  by Dr Anita Ruano     [facility] followup visit : 1 week_____________________________  (Please note your next appointment above and if you are unable to keep, kindly give a 24 hour notice.  Thank you.)     Physician signature:__________________________     If you experience any of the following, please call the [facility] during business hours:     * Increase in Pain  * Temperature over 101  * Increase in drainage from your wound  * Drainage with a foul odor  * Bleeding  * Increase in swelling  * Need for compression bandage changes due to slippage, breakthrough drainage.     If you need medical attention outside of the business hours of the [address] [place] [address] [address] please contact your PCP or go to the nearest emergency room.                    Electronically signed by Radu Logan MD on 1/19/2021 at 2:22 PM

## 2021-01-20 ENCOUNTER — HOSPITAL ENCOUNTER (OUTPATIENT)
Dept: WOUND CARE | Age: 21
Discharge: HOME | End: 2021-01-20
Payer: MEDICAID

## 2021-01-20 VITALS
HEART RATE: 89 BPM | WEIGHT: 200 LBS | RESPIRATION RATE: 18 BRPM | DIASTOLIC BLOOD PRESSURE: 80 MMHG | HEIGHT: 72 IN | BODY MASS INDEX: 27.09 KG/M2 | TEMPERATURE: 97.5 F | SYSTOLIC BLOOD PRESSURE: 128 MMHG

## 2021-01-20 DIAGNOSIS — L98.492 ULCER OF ABDOMEN WALL WITH FAT LAYER EXPOSED (HCC): Primary | ICD-10-CM

## 2021-01-20 DIAGNOSIS — Z89.612 S/P AKA (ABOVE KNEE AMPUTATION), LEFT (HCC): ICD-10-CM

## 2021-01-20 PROBLEM — T81.328A DISRUPTION OF CLOSURE OF MUSCLE OR MUSCLE FLAP: Chronic | Status: RESOLVED | Noted: 2020-10-09 | Resolved: 2021-01-20

## 2021-01-20 PROBLEM — T81.32XA DISRUPTION OF CLOSURE OF MUSCLE OR MUSCLE FLAP: Chronic | Status: RESOLVED | Noted: 2020-10-09 | Resolved: 2021-01-20

## 2021-01-20 PROBLEM — I96 SKIN FLAP NECROSIS (HCC): Chronic | Status: RESOLVED | Noted: 2020-10-21 | Resolved: 2021-01-20

## 2021-01-20 PROBLEM — T86.828 SKIN FLAP NECROSIS (HCC): Chronic | Status: RESOLVED | Noted: 2020-10-21 | Resolved: 2021-01-20

## 2021-01-20 PROCEDURE — 97597 DBRDMT OPN WND 1ST 20 CM/<: CPT

## 2021-01-20 PROCEDURE — 97597 DBRDMT OPN WND 1ST 20 CM/<: CPT | Performed by: SURGERY

## 2021-01-20 RX ORDER — LIDOCAINE HYDROCHLORIDE 40 MG/ML
SOLUTION TOPICAL ONCE
Status: DISCONTINUED | OUTPATIENT
Start: 2021-01-20 | End: 2021-01-21 | Stop reason: HOSPADM

## 2021-01-20 NOTE — PROGRESS NOTES
[facility] Followup Visit Note    Referring Physician : MD Keegan Raymond [place] Jorge RECORD NUMBER:  [id number]  AGE: 24 y.o. GENDER: male  : 2000  EPISODE DATE:  2021    Subjective:     Chief Complaint   Patient presents with    Wound Check     abdomen      HISTORY of PRESENT ILLNESS HPI   Aaron Marquez is a 24 y.o. male who presents today in regards to follow up evaluation and treatment of wound/ulcer. That patient's past medical, family and social hx were reviewed and changes were made if present. History of Wound Context:  Patient presents in regards to chronic left lower extremity and abdominal wounds. He was a trauma and on admission underwent ex lap, control of mesenteric bleed with small bowel resection, gastrotomy. He also underwent multiple orthopaedic procedures, irrigation, debridements on adission. He had a traumatic amputation of his left leg, large traumatic lateral and medial thigh wound, a left open subtrochanteric femur fx and a left knee traumatic arthrotomy in addition to multiple other injuries. He  Eventually had IMN of the left femur and a complex secondary closure of his lateral left thigh wound, muscle flap. This muscle flap failed and he had a large open wound noted in lateral thigh which tracked underneath the left femur to the medial aspect of the left. He underwent hbo. He had skin graft by Dr. Manzo Gone to left thigh with almost 100% take. 12/10/20 he had removal of his external fixator by Dr. Lisa Augustin.       20  · Overall wound improving  · aquacell  ·  ordered through [facility]  21  · Wound improving  · Complaining of left scrotal pain for 1 week with dyuria - spoke with ER - he will go there to be evaluated  21  · Wounds improving  21  · Abdominal wound improved  · Left thigh wound healed    Wound/Ulcer Pain Timing/Severity: waxing and waning  Quality of pain: aching, shooting, tender  Severity:  3 / 10 Modifying Factors: Pain worsens with pressure, debridement  Associated Signs/Symptoms: drainage and pain    Ulcer Identification:  Ulcer Type: non-healing surgical and traumatic  Contributing Factors: decreased mobility    Diabetic/Pressure/Non Pressure Ulcers only:  Ulcer: Non-Pressure ulcer, fat layer exposed    Wound: Dehiscence of traumatic injury wound repair        PAST MEDICAL HISTORY      Diagnosis Date    Disruption of closure of muscle or muscle flap 10/9/2020    Encounter regarding vascular access for dialysis for ESRD ([facility] [address] 9/15/2020    History of blood transfusion     S/P AKA (above knee amputation), left ([facility] [address] 12/30/2020    Skin flap necrosis ([facility] [address] 10/21/2020    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/6/2020     Past Surgical History:   Procedure Laterality Date    APPLY DRESSING Left 9/17/2020    LEFT LOWER EXTREMITY I & D performed by Yasmeen Donald MD at [address] [place] [address] 10/13/2020    TESIO CATHETER REMOVAL---PT ON SELECT performed by Reji Frank MD at [address] [place] [address] Left 9/3/2020    INCISION AND DRAINAGE LEFT LOWER LEG WITH REVISION OF BELOW THE KNEE AMPUTATION AND APPLICATION OF WOUND VAC performed by Yasmeen Donald MD at [address] N/A 9/15/2020    CATHETER INSERTION HEMODIALYSIS TUNNELED REMOVAL OF TEMP DIALYSIS CATH performed by Reji Frank MD at [address] [place] [address] Right 9/28/2020    LEFT THIGH IRRIGATION AND DEBRIDEMENT, RIGHT SI SCREW, REVISION OF PELVIC EXTERNAL FIXATOR performed by Lilian Cm DO at [address] [place] [address] Left 10/5/2020    IRRIGATION AND DEBRIDEMENT LEFT THIGH COMPLEX POST OP WOUND WITH APPLICATION WOUND VAC performed by Lilian Cm DO at [address] [place] [address] Left 10/8/2020    IRRIGATION AND DEBRIDEMENT LEFT THIGH WOUND GREATER THAN 20 SQUARE CM performed by Yasmeen Donald MD at [place] [address] N/A 8/29/2020 (with meals)      amitriptyline (ELAVIL) 25 MG tablet Take 25 mg by mouth nightly       methocarbamol (ROBAXIN) 500 MG tablet Take 1,000 mg by mouth 3 times daily       gabapentin (NEURONTIN) 100 MG capsule Take 1 capsule by mouth 3 times daily for 30 days. (Patient taking differently: 600 mg by PEG Tube route 3 times daily. ) 90 capsule 0    Misc. Devices [facility]) MISC 20\" seat width. Diagnosis: traumatic amputation of left lower extremity, left femur fracture, pelvis fractures with disruption of pelvic ring 1 each 0    acetaminophen (TYLENOL) 325 MG tablet 650 mg by PEG Tube route every 6 hours as needed for Pain       No current facility-administered medications on file prior to encounter. REVIEW OF SYSTEMS See HPI    Objective:    /80   Pulse 89   Temp 97.5 °F (36.4 °C) (Temporal)   Resp 18   Ht 6' (1.829 m)   Wt 200 lb (90.7 kg)   BMI 27.12 kg/m²   Wt Readings from Last 3 Encounters:   01/20/21 200 lb (90.7 kg) (93 %, Z= 1.45)*   01/06/21 200 lb (90.7 kg) (93 %, Z= 1.46)*   01/06/21 197 lb (89.4 kg) (92 %, Z= 1.38)*     * Growth percentiles are based on CDC (Boys, 2-20 Years) data. PHYSICAL EXAM  CONSTITUTIONAL:   Awake, alert, cooperative   EYES:  lids and lashes normal   ENT: external ears and nose without lesions   NECK:  supple, symmetrical, trachea midline   SKIN:  Open wound present    Assessment:     Problem List Items Addressed This Visit     S/P AKA (above knee amputation), left (HCC) (Chronic)    Ulcer of abdomen wall with fat layer exposed ([address] - Primary          Pre Debridement Measurements:  Are located in the [place]  Documentation Flow Sheet  Post Debridement Measurements:  Wound/Ulcer Descriptions are Pre Debridement except measurements:     Negative Pressure Wound Therapy Leg Left;Upper; Lateral (Active)   Number of days: 106       Negative Pressure Wound Therapy Leg Anterior; Upper;Left (Active)   Number of days: 91       Wound 08/29/20 Thigh Left;Medial (Active)   Number of days: 144       Wound 08/29/20 Leg Left;Distal (Active)   Number of days: 144       Wound 09/03/20 Abdomen second distal (Active)   Number of days: 139       Wound 09/08/20 Abdomen most proximal (Active)   Number of days: 134       Wound 09/03/20 Abdomen second proximal (Active)   Number of days: 139       Wound 09/03/20 Abdomen third most proximal (Active)   Number of days: 139       Wound 09/03/20 Abdomen forth most proximal (Active)   Number of days: 139       Wound 09/03/20 Abdomen most distal (Active)   Number of days: 139       Wound 09/03/20 Abdomen third distal (Active)   Number of days: 139       Wound 09/03/20 Abdomen forth distal (Active)   Number of days: 139       Wound 09/10/20 Wrist Right;Medial (Active)   Number of days: 132       Wound 09/08/20 Leg Left;Distal;Upper (Active)   Number of days: 134       Wound 10/20/20 Thigh Left; Anterior SKIN GRAFT SITE (Active)   Number of days: 92       Wound 12/30/20 Thigh Left #1 (Active)   Wound Image   01/20/21 0812   Wound Etiology Traumatic 12/30/20 0807   Dressing Status New dressing applied;Clean;Dry; Intact 01/13/21 0956   Wound Cleansed Irrigated with saline 01/13/21 0956   Dressing/Treatment Collagen;Dry dressing 01/13/21 0956   Offloading for Diabetic Foot Ulcers Other (comment) 01/13/21 0956   Wound Length (cm) 0 cm 01/20/21 0812   Wound Width (cm) 0 cm 01/20/21 0812   Wound Depth (cm) 0 cm 01/20/21 0812   Wound Surface Area (cm^2) 0 cm^2 01/20/21 0812   Change in Wound Size % (l*w) 100 01/20/21 0812   Wound Volume (cm^3) 0 cm^3 01/20/21 0812   Wound Healing % 100 01/20/21 0812   Post-Procedure Length (cm) 0 cm 01/20/21 0919   Post-Procedure Width (cm) 0 cm 01/20/21 0919   Post-Procedure Depth (cm) 0 cm 01/20/21 0919   Post-Procedure Surface Area (cm^2) 0 cm^2 01/20/21 0919   Post-Procedure Volume (cm^3) 0 cm^3 01/20/21 0919   Wound Assessment Fibrin 01/13/21 0829   Drainage Amount Moderate 01/13/21 0829   Drainage Description Serous 01/13/21 0829   Odor None 01/13/21 0829   Scarlet-wound Assessment Intact 01/13/21 0829   Number of days: 21       Wound 12/30/20 Abdomen Lower;Medial #2 (Active)   Wound Image   12/30/20 0807   Wound Etiology Traumatic 12/30/20 0807   Dressing Status New dressing applied 01/13/21 0956   Wound Cleansed Cleansed with saline 01/20/21 0928   Dressing/Treatment Collagen 01/20/21 0928   Offloading for Diabetic Foot Ulcers Other (comment) 01/13/21 0956   Wound Length (cm) 1 cm 01/20/21 0812   Wound Width (cm) 0.3 cm 01/20/21 0812   Wound Depth (cm) 0.1 cm 01/20/21 0812   Wound Surface Area (cm^2) 0.3 cm^2 01/20/21 0812   Change in Wound Size % (l*w) -650 01/20/21 0812   Wound Volume (cm^3) 0.03 cm^3 01/20/21 0812   Wound Healing % -200 01/20/21 0812   Post-Procedure Length (cm) 1.1 cm 01/20/21 0919   Post-Procedure Width (cm) 0.4 cm 01/20/21 0919   Post-Procedure Depth (cm) 0.1 cm 01/20/21 0919   Post-Procedure Surface Area (cm^2) 0.44 cm^2 01/20/21 0919   Post-Procedure Volume (cm^3) 0.04 cm^3 01/20/21 0919   Wound Assessment Pale granulation tissue 01/20/21 0812   Drainage Amount Scant 01/20/21 0812   Drainage Description Yellow 01/20/21 0812   Odor None 01/20/21 0812   Scarlet-wound Assessment Intact 01/20/21 0812   Number of days: 21     Incision 08/29/20 Hip Left (Active)   Number of days: 143       Incision 09/03/20 Abdomen Mid (Active)   Number of days: 138       Incision 09/11/20 Thigh Left;Lateral (Active)   Number of days: 130       Incision 09/15/20 Chest Right;Upper (Active)   Number of days: 126       Incision 09/17/20 Thigh Left (Active)   Number of days: 125       Incision 09/28/20 Pelvis Lateral;Right;Left (Active)   Number of days: 114       Incision 09/28/20 Pelvis Anterior (Active)   Number of days: 114       Incision 09/28/20 Thigh Left (Active)   Number of days: 114       Incision 10/05/20 Thigh Left;Lateral (Active)   Number of days: 106       Incision 10/08/20 Thigh Left (Active)   Number of days: 104       Incision 10/20/20 Thigh Right; Anterior (Active)   Number of days: 92       Incision 12/10/20 Abdomen Left (Active)   Number of days: 41       Incision 12/10/20 Anterior;Right (Active)   Number of days: 41       Procedure Note  Indications:  Based on my examination of this patient's wound(s)/ulcer(s) today, debridement is required to promote healing and evaluate the wound base. Performed by: Radu Logan MD    Consent obtained:  Yes    Time out taken:  Yes    Pain Control:       Debridement : NonExcisional Debridement    Using curette the wound(s)/ulcer(s) was/were sharply debrided down through and including the removal of epidermis, dermis     Devitalized Tissue Debrided:  fibrin, biofilm, slough, necrotic/eschar and exudate to stimulate bleeding to promote healing, post debridement good bleeding base and wound edges noted    Wound/Ulcer #: 2    Percent of Wound/Ulcer Debrided: 100%    Total Surface Area Debrided: 0.3 sq cm     Estimated Blood Loss:  Minimal  Hemostasis Achieved:  by pressure    Procedural Pain:  3  / 10   Post Procedural Pain:  2 / 10     Response to treatment:  Well tolerated by patient. Plan:   Treatment Note please see attached Discharge Instructions    Written patient dismissal instructions given to patient and signed by patient or POA. Discharge Instructions       Visit Discharge/Physician Orders     Discharge condition: Stable     Assessment of pain at discharge:no     Anesthetic used: 4% lidocaine soln.     Discharge to: Home     Left via:Private automobile     Accompanied by: cousin     ECF/HHA: n/a(supplies [facility])     Dressing Orders:  ABDOMEN-Cleanse with normal saline, apply promogran(slightly moisten with normal saline),dry dressing and secure. Change daily.     Treatment Orders:Eat a diet high in protein and vitamin C.  Take a multiple vitamin daily unless contraindicated.     Keep all pressure off of wound sites       orthotics consult was made for  by Dr Balaji Maya     [facility] followup visit : 1 week_____________________________  (Please note your next appointment above and if you are unable to keep, kindly give a 24 hour notice.  Thank you.)     Physician signature:__________________________     If you experience any of the following, please call the [email or web address] [facility] [employer] during business hours:     * Increase in Pain  * Temperature over 101  * Increase in drainage from your wound  * Drainage with a foul odor  * Bleeding  * Increase in swelling  * Need for compression bandage changes due to slippage, breakthrough drainage.     If you need medical attention outside of the business hours of the [employer] [facility] please contact your PCP or go to the nearest emergency room.                 Electronically signed by Maria Esther Bailey MD on 1/20/2021 at 10:34 AM

## 2021-01-22 DIAGNOSIS — S32.9XXD: Primary | ICD-10-CM

## 2021-01-22 DIAGNOSIS — S72.362F: ICD-10-CM

## 2021-01-27 ENCOUNTER — HOSPITAL ENCOUNTER (OUTPATIENT)
Dept: WOUND CARE | Age: 21
Discharge: HOME | End: 2021-01-27
Payer: MEDICAID

## 2021-01-27 ENCOUNTER — HOSPITAL ENCOUNTER (OUTPATIENT)
Dept: GENERAL RADIOLOGY | Age: 21
Discharge: HOME | End: 2021-01-29
Payer: MEDICAID

## 2021-01-27 ENCOUNTER — OFFICE VISIT (OUTPATIENT)
Dept: ORTHOPEDIC SURGERY | Age: 21
End: 2021-01-27
Payer: MEDICAID

## 2021-01-27 VITALS — TEMPERATURE: 97 F

## 2021-01-27 VITALS
HEIGHT: 72 IN | BODY MASS INDEX: 27.09 KG/M2 | SYSTOLIC BLOOD PRESSURE: 118 MMHG | HEART RATE: 72 BPM | TEMPERATURE: 99.5 F | WEIGHT: 200 LBS | DIASTOLIC BLOOD PRESSURE: 62 MMHG | RESPIRATION RATE: 18 BRPM

## 2021-01-27 DIAGNOSIS — S32.301S: ICD-10-CM

## 2021-01-27 DIAGNOSIS — S72.362F: ICD-10-CM

## 2021-01-27 DIAGNOSIS — Z89.612 S/P AKA (ABOVE KNEE AMPUTATION), LEFT (HCC): ICD-10-CM

## 2021-01-27 DIAGNOSIS — S32.9XXD: ICD-10-CM

## 2021-01-27 DIAGNOSIS — L98.491 ULCER OF ABDOMEN WALL, LIMITED TO BREAKDOWN OF SKIN (HCC): Primary | Chronic | ICD-10-CM

## 2021-01-27 DIAGNOSIS — S88.912D TRAUMATIC AMPUTATION OF LEFT LOWER EXTREMITY, SUBSEQUENT ENCOUNTER (HCC): Primary | ICD-10-CM

## 2021-01-27 PROCEDURE — 97597 DBRDMT OPN WND 1ST 20 CM/<: CPT | Performed by: SURGERY

## 2021-01-27 PROCEDURE — 99212 OFFICE O/P EST SF 10 MIN: CPT | Performed by: ORTHOPAEDIC SURGERY

## 2021-01-27 PROCEDURE — 97597 DBRDMT OPN WND 1ST 20 CM/<: CPT

## 2021-01-27 PROCEDURE — 72190 X-RAY EXAM OF PELVIS: CPT

## 2021-01-27 PROCEDURE — 99024 POSTOP FOLLOW-UP VISIT: CPT | Performed by: ORTHOPAEDIC SURGERY

## 2021-01-27 PROCEDURE — 73552 X-RAY EXAM OF FEMUR 2/>: CPT

## 2021-01-27 RX ORDER — LIDOCAINE HYDROCHLORIDE 40 MG/ML
SOLUTION TOPICAL ONCE
Status: DISCONTINUED | OUTPATIENT
Start: 2021-01-27 | End: 2021-01-28 | Stop reason: HOSPADM

## 2021-01-27 NOTE — PROGRESS NOTES
Jasbir Alberto is a 24 y.o. male who presents for follow up of pelvis, L BKA    SURGEON: Dr. Graciela King, Dr. Pam Rhodes  Date last seen in office: 12/30/20    Symptoms: better  New complaints: lateral side of left leg painful    Incision(s): Edges approximated no drainage noted no redness no swelling no tenderness to palpation    Weightbearing: WBAT RLE     in place     Assistive device Walker - standard  Participating in therapy (location if yes)? yes, home health    Refills Needed: None  Order/Referral Needed: yes, physical therapy      C/O numbness to right hand, first three fingers. They have not heard from scheduling to obtain EMG.

## 2021-01-27 NOTE — PATIENT INSTRUCTIONS
You were ordered EMG by your Orthopedic Provider.   If you do not hear from that department please contact: [phone]    Referral to [facility] outpatient therapy placed    Referral to Dr. Tj Valle placed  [address] [facility]. and [address] [facility] MD [place]  [address], [facility] [place] [facility] - BE[facility]AV[facility]AL [facility], [facility]  Phone: [phone]    Follow up as needed  Call if prosthetic obtained
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
## 2021-02-02 ENCOUNTER — HOSPITAL ENCOUNTER (OUTPATIENT)
Dept: NEUROLOGY | Age: 21
Discharge: HOME | End: 2021-02-02
Payer: MEDICAID

## 2021-02-02 VITALS — BODY MASS INDEX: 26.68 KG/M2 | WEIGHT: 197 LBS | HEIGHT: 72 IN

## 2021-02-02 DIAGNOSIS — S88.912D TRAUMATIC AMPUTATION OF LEFT LOWER EXTREMITY, SUBSEQUENT ENCOUNTER (HCC): ICD-10-CM

## 2021-02-02 PROCEDURE — 95911 NRV CNDJ TEST 9-10 STUDIES: CPT | Performed by: PHYSICAL MEDICINE & REHABILITATION

## 2021-02-02 PROCEDURE — 95911 NRV CNDJ TEST 9-10 STUDIES: CPT

## 2021-02-02 PROCEDURE — 95886 MUSC TEST DONE W/N TEST COMP: CPT | Performed by: PHYSICAL MEDICINE & REHABILITATION

## 2021-02-02 PROCEDURE — 95886 MUSC TEST DONE W/N TEST COMP: CPT

## 2021-02-02 NOTE — PROCEDURES
[facility]  [address] [place] [address], [address] [place] [address]  Phone: [phone]  Fax: [phone]      Referring Provider: Oscar Rg*  Primary Care Physician: Breana Jin MD  Patient Name: Armand Dove  Patient YOB: 2000  Gender: male  BMI: Body mass index is 26.72 kg/m². Height 6' (1.829 m), weight 197 lb (89.4 kg). 2/2/2021    Description of clinical problem:   CC: right hand numbness and weakness    Pain No, Numbness/tingling  Yes- D1-3; Weakness  Yes       Brief physical exam:   Sensory deficit Yes- decreased sensation right median nerve distribution; Weakness Yes; Atrophy  Yes; Reflex abnormality No    Motor NCS      Nerve / Sites Lat. Amplitude Distance Velocity Temp. Amp. 1-2    ms mV cm m/s °C %   R Median - APB      Wrist 7.03 0.6 8  32 100      Elbow 14.69 0.5 23 30 32 82.9   R Ulnar - ADM      Wrist 3.91 2.1 8  32 100      B. Elbow 8.33 1.6 21 47 32 73.6      A. Elbow 10.10 1.4 10 56 32 67.4   R Radial - EIP      Forearm 2.14 4.0 6  33.6 100      Spiral groove 6.77 3.6 24 52 33.8 89.9   R Ulnar - FDI      Wrist 5.26 1.3   32 100      B. Elbow 9.84 1.1 21 46 32 90.1      A. Elbow 11.51 1.1 10 60 32 91.3       Sensory NCS      Nerve / Sites Peak Lat PP Amp Distance Velocity Temp.     ms µV cm m/s °C   R Median - Digit II (Antidromic)      Mid Palm NR NR 7 NR 31.7      Wrist NR NR 14 NR 31.7   R Ulnar - Digit V (Antidromic)      Wrist 3.39 31.0 14 50 32   R Radial - Anatomical snuff box (Forearm)      Forearm 2.60 15.6 10 51 32   R Dorsal ulnar cutaneous - Hand dorsum (Forearm)      Forearm 2.66 18.2 8 37 33.9   R Lateral antebrachial cutaneous - Forearm (Elbow)      Elbow 2.19 10.5 10 55 34   R Medial antebrachial cutaneous - Forearm (Elbow)      Elbow 2.24 12.1 10 55 33.8       F  Wave      Nerve F Lat M Lat F-M Lat    ms ms ms   R Median - APB NR NR NR   R Ulnar - ADM 32.5 3.9 28.6       EMG         EMG Summary Table     Spontaneous MUAP Recruitment   Muscle IA Fib PSW Fasc H.F. Amp Dur. PPP Pattern   R. Biceps brachii N None None None None N N N N   R. Triceps brachii N None None None None N N N Decr   R. Deltoid N None None None None N N N N   R. Pronator teres N None None None None N N N N   R. Flexor carpi ulnaris N None None None None N N 1+ Decr   R. Extensor indicis proprius N None None None None N N N Decr   R. First dorsal interosseous Incr 1+ 1+ None None N N 1+ Decr   R. Abductor pollicis brevis Incr 1+ 1+ None None N N 1+ Decr   R. Cervical paraspinals (low) N None None None None       R. Flexor pollicis longus N None None None None N N 1+ Decr        Study Limitations: Intolerance     Summary of Findings:    1. Sensory nerve conduction studies of the rightmedian nerve were absent. All other nerves tested showed normal peak latencies, normal SNAP amplitudes, and normal conduction velocities. 2. Motor nerve conduction studies of the right median nerve showed prolonged distal wrist latency, small amplitude  And slow conduction velocity. The right ulnar nerve showed prolonged distal latencies and small amplitudes. The right radial nerve showed small amplitude with normal distal latency. 3. F-wave studies of the right median nerve were absent. The right ulnar nerve revealed a prolonged latency. 4. EMG was performed with monopolar needle in multiple muscles outlined above, including the right cervical paraspinals. There was decreased recruitment in the right triceps and extensor indicis proprius muscle. There was decreased recruitment with polyphasic potentials in the right flexor carpi ulnaris and flexor pollicis brevis muscle. There was evidence of active denervation with chronic re-innervation in the right abductor pollicis brevis and first dorsal interosseous muscles. All other muscles tested revealed normal insertional activity, without evidence of abnormal spontaneous activity.  All MUAP's were of normal amplitude and duration with a full recruitment pattern. No increase in polyphasic potentials was noted. Diagnostic Interpretation: This study was complexly abnormal.     1. 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 It is chronic in duration, severe in severity. Prognosis for demyelinating lesions is good, poor for axonal lesions. It is difficult to localize but is distal to pronator teres based on needle exam.     Clinical symptoms appear to correlate mostly with median nerve injury but there is also evidence to suggest a possible right C8 motor radiculopathy. This could not be confirmed as paraspinal testing was normal so I could not rule out the possibility of a brachial plexopathy. Clinical correlation is advised. Previous Study: none       Follow up EMG is recommended if clinically indicated. Technologist: Akanksha Tapia    Physician: Shari Thomas DO, [facility]   Board Certified Physical Medicine and Rehabilitation      Nerve conduction studies and electromyography were performed according to our laboratory policies and procedures which can be provided upon request. All abnormal values are identified in the table.  Laboratory normal values can also be provided upon request.       Cc: Madhavi Mar MD

## 2021-02-03 ENCOUNTER — HOSPITAL ENCOUNTER (OUTPATIENT)
Dept: WOUND CARE | Age: 21
Discharge: HOME | End: 2021-02-03
Payer: MEDICAID

## 2021-02-03 ENCOUNTER — TELEPHONE (OUTPATIENT)
Dept: ADMINISTRATIVE | Age: 21
End: 2021-02-03

## 2021-02-03 VITALS
RESPIRATION RATE: 16 BRPM | HEART RATE: 72 BPM | DIASTOLIC BLOOD PRESSURE: 90 MMHG | SYSTOLIC BLOOD PRESSURE: 132 MMHG | TEMPERATURE: 98.1 F

## 2021-02-03 DIAGNOSIS — L98.491 ULCER OF ABDOMEN WALL, LIMITED TO BREAKDOWN OF SKIN (HCC): Primary | Chronic | ICD-10-CM

## 2021-02-03 PROCEDURE — 99213 OFFICE O/P EST LOW 20 MIN: CPT | Performed by: SURGERY

## 2021-02-03 PROCEDURE — 99212 OFFICE O/P EST SF 10 MIN: CPT

## 2021-02-03 RX ORDER — LIDOCAINE HYDROCHLORIDE 40 MG/ML
SOLUTION TOPICAL ONCE
Status: DISCONTINUED | OUTPATIENT
Start: 2021-02-03 | End: 2021-02-04 | Stop reason: HOSPADM

## 2021-02-03 NOTE — PLAN OF CARE
Problem: Wound:  Goal: Will show signs of wound healing; wound closure and no evidence of infection  Description: Will show signs of wound healing; wound closure and no evidence of infection  2/3/2021 8010 by Jacobo Wharton RN  Outcome: Ongoing  2/3/2021 0808 by Jacobo Wharton RN  Outcome: Ongoing

## 2021-02-03 NOTE — TELEPHONE ENCOUNTER
Patients brother calling to schedule appointment to review EMG results ASAP. Results in chart- test performed yesterday.
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
## 2021-02-04 NOTE — TELEPHONE ENCOUNTER
Call placed to patient's cousin, Suzan Main. Advised patient to follow up with Dr. Lisha Street. Provided with office number to call. Verbalized understanding.

## 2021-02-05 NOTE — TELEPHONE ENCOUNTER
Phoned patient / Anish Gupta [facility] [phone]), lvm to return our call to schedule an appt

## 2021-02-19 ENCOUNTER — HOSPITAL ENCOUNTER (OUTPATIENT)
Dept: PHYSICAL THERAPY | Age: 21
Setting detail: THERAPIES SERIES
Discharge: HOME | End: 2021-02-19
Payer: MEDICAID

## 2021-02-19 PROCEDURE — 97161 PT EVAL LOW COMPLEX 20 MIN: CPT
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
## 2021-02-22 NOTE — FLOWSHEET NOTE
[facility]  Phone: [phone] Fax: [phone]     Physical Therapy  Out Patient Initial Evaluation    Date:  21    Patient Name:  Irma Delong    :  2000  MRN: [id number]    DIAGNOSIS:  Left AKA Traumatic  EVALUATION DATE:  21  REFERRING PHYSICIAN:  Dr Lundberg Men:  20    PROBLEMS FOUND DURING EVALUATION  · Pain: Affects the right lumbar/Right posterior hip and buttock areas. Pain rated Constant Pain noted with weight bearing and ROM   · Dependent gait due to amputation  · Limited ROM/Mobility Lumbar region and bilateral hips   · Deficits of strength trunk/core and bilateral LE's   · Deficits of endurance: General     SHORT TERM GOALS  · Patient will be able to engage in consistent and progressive active exercise while reporting no increase in acute back or hip pain symptoms   · Establish HEP     LONG TERM GOALS  · Patient will be able to sustain functional ADL's and ambulation while effectively controlling back/right hip pain at 4/10 or less  · Patient will demonstrate [address] or better gait quality using appropriate LE prosthesis   · AROM of the trunk/lumbo-pelvic region will be Fair or better Bilateral hip ROM will be WFL's   · Strength: Trunk/core 3+ to 4-/5 Bilateral LE's 4-/5 or better  · Endurance for ADL's and ambulation Fair to Fair+  · Patient will be able to maintain and self direct an appropriate follow up independent exercise program     PATIENT GOALS  · Independent ambulation     REHAB POTENTIAL:  Fair    FREQUENCY/DURATION:  1-3 times per week 6 weeks     PLAN OF CARE:  Progressive exercise Gait training Modalities as needed Balance and coordination activity HEP       Thank you for the opportunity to work with your patient. If you have questions or comments, please feel free to contact me by phone or fax.       Electronically Signed by Ibrahima Dong  [id number]        ___________________________________ 02/19/2021    Physician     Date

## 2021-03-03 ENCOUNTER — HOSPITAL ENCOUNTER (OUTPATIENT)
Dept: PHYSICAL THERAPY | Age: 21
Setting detail: THERAPIES SERIES
Discharge: HOME | End: 2021-03-03
Payer: MEDICAID

## 2021-03-03 PROCEDURE — 97110 THERAPEUTIC EXERCISES: CPT

## 2021-03-03 NOTE — PROGRESS NOTES
[facility]  Phone: [phone] Fax: [phone]       Physical Therapy Daily Treatment Note  Date:  3/3/2021    Patient Name:  Miguel Kulkarni    :  2000  MRN: [id number]    Restrictions/Precautions:    Diagnosis:  Traumatic Left AKA Pelvic Fx   Treatment Diagnosis:    Insurance/Certification information:    Referring Physician:  Dr Modesto Acevedo of care signed (Y/N):    Visit# / total visits: 1 /10  Pain level: /10  Time In: 1110       Time Out:  1145        Subjective:      Exercises:  Exercise/Equipment Resistance/Repetitions Other comments     Seated flex with ball               Bridge 5 reps       Supine hip ext 5 reps 2 sets bilateral  AA     Sidelying hip ext str left  5 reps 2 sets assisted     Prone press up   5 reps 2 sets     LTR  5 reps 2 sets              Flex band row  10 reps  Seated on ball      Dumbbell press    5# 10 reps on ball                                                                       Other Therapeutic Activities:      Home Exercise Program:      Manual Treatments:      Modalities:      Timed Code Treatment Minutes:  30    Total Treatment Minutes:  40    Treatment/Activity Tolerance:  [x] Patient tolerated treatment well [] Patient limited by fatigue  [] Patient limited by pain  [] Patient limited by other medical complications  [] Other:     Plan:   [x] Continue per plan of care [] Alter current plan (see comments)  [] Plan of care initiated [] Hold pending MD visit [] Discharge  Plan for Next Session:         Treatment Charges: Mins Units   Initial Evaluation     Re-Evaluation     Ther Exercise         TE 30 2   Manual Therapy     MT     Ther Activities        TA     Gait Training          GT     Neuro Re-education NR     Modalities     Non-Billable Service Time     Other     Total Time/Units 30 2     Electronically signed by:  Adan Lewis, [address] [facility]

## 2021-03-08 ENCOUNTER — HOSPITAL ENCOUNTER (OUTPATIENT)
Dept: PHYSICAL THERAPY | Age: 21
Setting detail: THERAPIES SERIES
Discharge: HOME | End: 2021-03-08
Payer: MEDICAID

## 2021-03-08 PROCEDURE — G0283 ELEC STIM OTHER THAN WOUND: HCPCS

## 2021-03-08 PROCEDURE — 97110 THERAPEUTIC EXERCISES: CPT

## 2021-03-08 NOTE — PROGRESS NOTES
[facility]  Phone: [phone] Fax: [phone]       Physical Therapy Daily Treatment Note  Date:  3/8/2021    Patient Name:  Britt Mg    :  2000  MRN: [id number]    Restrictions/Precautions:    Diagnosis:  Traumatic Left AKA Pelvic Fx   Treatment Diagnosis:    Insurance/Certification information:  48 units each [id number]  Referring Physician:  Dr Duque Books of care signed (Y/N):    Visit# / total visits: 2/10  Pain level: /10  Time In: 10:35   ( 05737,  88527)  Time Out: 11:25        Subjective:  Pt ambulates into clinic using standard walker. States he has been having pain across low back and post hips which interrupts his sleep. Exercises:  Exercise/Equipment Resistance/Repetitions Other comments     Seated flex with ball      UBE nt    SKTC  AA 10 x  d/t pain w/ pt attempt   LTR   10 x AA   Bridge 10 reps     Prone prop  2 min Facilitate extension   Side lying left hip abd 7 x Limited by fatigue   LTR  5 reps 2 sets     Seated on ball t band pulldown   PTB 2 x 10     rows  PTB 2 x 10     Dumbbell press    5# 10 reps on ball  nt   Static sitting on ball  3 min  Cued posture                                                              Other Therapeutic Activities:      Home Exercise Program: (3/8/2021) Instructed pt in isometric left hip abd using a pillow at home     Manual Treatments:      Modalities: IFC and MHP to lumbar area x 20 min for pain control- pt seated. Timed Code Treatment Minutes:  50    Total Treatment Minutes:  50    Treatment/Activity Tolerance:  [x] Patient tolerated treatment well [] Patient limited by fatigue  [] Patient limited by pain  [] Patient limited by other medical complications  [x] Other: Pt reported good relief with estim. Bed mobility on the mat guarded d/t back/hip pain.     Plan:   [x] Continue per plan of care [] Alter current plan (see comments)  [] Plan of care initiated [] Hold pending MD visit [] Discharge  Plan for Next Session:         Treatment Charges: Mins Units   Initial Evaluation     Re-Evaluation     Ther Exercise         TE 30 2   Manual Therapy     MT     Ther Activities        TA     Gait Training          GT     Neuro Re-education NR     Modalities 20 1   Non-Billable Service Time     Other     Total Time/Units 50 3     Electronically signed by:   AUDELIA Dowell  [id number]

## 2021-03-11 ENCOUNTER — HOSPITAL ENCOUNTER (OUTPATIENT)
Dept: PHYSICAL THERAPY | Age: 21
Setting detail: THERAPIES SERIES
Discharge: HOME | End: 2021-03-11
Payer: MEDICAID

## 2021-03-11 PROCEDURE — 97530 THERAPEUTIC ACTIVITIES: CPT

## 2021-03-11 PROCEDURE — 97110 THERAPEUTIC EXERCISES: CPT

## 2021-03-11 NOTE — PROGRESS NOTES
[facility]  Phone: [phone] Fax: [phone]       Physical Therapy Daily Treatment Note  Date:  3/11/2021    Patient Name:  Harjinder Rodarte    :  2000  MRN: [id number]    Restrictions/Precautions:    Diagnosis:  Traumatic Left AKA Pelvic Fx   Treatment Diagnosis:    Insurance/Certification information:  48 units each [id number]  Referring Physician:  Dr Ramila Heredia of care signed (Y/N):    Visit# / total visits: 3/10  Pain level: /10  Time In: 13:00   ( 25960,  10207,  75064)  Time Out: 14:00        Subjective:  Pt has no new c/o today. States he is waiting for insurance authorization for his prosthesis. Pt and family member asking about using lofstrand crutches. States he tried axillary crutches at home however they caused discomfort under his arms. Exercises:  Exercise/Equipment Resistance/Repetitions Other comments     Seated flex with ball      UBE nt    SKTC  AA 10 x                        nt d/t pain w/ pt attempt   LTR   10 x AA   Bridge 10 reps     Sidelying hip ext str left  10\" 6 x       Side lying left hip abd 10 x Limited by fatigue        Seated on ball Cybex 360 pulldown   3 pl 10 x    Hi-Lo rows 3 pl 10 x     Dumbbell press    5# 10 reps on ball  nt        Sit to stand from 24\" box 2 x 10    Standing hip abduction 10 x @ walker                 extension 10 x @ walker                                        Other Therapeutic Activities: Instructed pt in use of loftstrand crutches. Pt ambulated 30' x 2 using lofstrand crutches and CGA with gait belt. One LOB which pt self corrected. Home Exercise Program: (3/8/2021) Instructed pt in isometric left hip abd using a pillow at home     Manual Treatments:      Modalities: .   NT per pt    Timed Code Treatment Minutes:  55    Total Treatment Minutes:  60    Treatment/Activity Tolerance:  [x] Patient tolerated treatment well [] Patient limited by fatigue  [] Patient limited by pain  [] Patient limited by other medical complications  [x] Other: Good tolerance for progression of exercises today. Plan:   [x] Continue per plan of care [] Alter current plan (see comments)  [] Plan of care initiated [] Hold pending MD visit [] Discharge  Plan for Next Session:         Treatment Charges: Mins Units   Initial Evaluation     Re-Evaluation     Ther Exercise         TE 35 2   Manual Therapy     MT     Ther Activities        TA 20 1   Gait Training          GT     Neuro Re-education NR     Modalities     Non-Billable Service Time     Other 5    Total Time/Units 60 3     Electronically signed by:   José Luis Fernandez, LPTA  [id number]

## 2021-03-16 ENCOUNTER — HOSPITAL ENCOUNTER (OUTPATIENT)
Dept: PHYSICAL THERAPY | Age: 21
Setting detail: THERAPIES SERIES
Discharge: HOME | End: 2021-03-16
Payer: MEDICAID

## 2021-03-16 ENCOUNTER — TELEPHONE (OUTPATIENT)
Dept: ORTHOPEDIC SURGERY | Age: 21
End: 2021-03-16

## 2021-03-16 DIAGNOSIS — M79.641 RIGHT HAND PAIN: Primary | ICD-10-CM

## 2021-03-16 PROCEDURE — 97530 THERAPEUTIC ACTIVITIES: CPT

## 2021-03-16 PROCEDURE — 97110 THERAPEUTIC EXERCISES: CPT

## 2021-03-16 NOTE — PROGRESS NOTES
[facility]  Phone: [phone] Fax: [phone]       Physical Therapy Daily Treatment Note  Date:  3/16/2021    Patient Name:  Stacey Quiroga    :  2000  MRN: [id number]    Restrictions/Precautions:    Diagnosis:  Traumatic Left AKA Pelvic Fx   Treatment Diagnosis:    Insurance/Certification information:  48 units each [id number]  Referring Physician:  Dr Champagne Heads of care signed (Y/N):    Visit# / total visits: 4/10  Pain level: /10  Time In: 13:05   ( 10308,  94691,  92466)  Time Out: 14:00        Subjective:  Pt has no new c/o today. Exercises:  Exercise/Equipment Resistance/Repetitions Other comments     Seated flex with ball      UBE 5 min fwd/bckwd   SKTC  AA 10 x                        nt d/t pain w/ pt attempt   LTR   10 x                              nt AA   Bridge 10 reps                         nt    R side lying hip ext 10 reps 2 sets (L)    Sidelying hip ext str left  10\" 6 x             AA hip ext 10 x                Seated on ball Cybex 360 pulldown   3 pl 10 x 2    Hi-Lo rows 2 pl 10 x 2     Dumbbell press    5# 10 reps on ball  nt        Sit to stand from 24\" box 2 x 5    Static standing lt man resist 5 x                Reaching RUE multi-level                                         Other Therapeutic Activities: Pt ambulated 220' using lofstrand crutches and CGA with gait belt with 1 seated and 1 standing rest break. Brother followed with wheelchair. Ambulated between activities using crutches as well. Repeated cue for pt to manage his pace for safety. Home Exercise Program: (3/8/2021) Instructed pt in isometric left hip abd using a pillow at home     Manual Treatments:      Modalities: .   NT per pt    Timed Code Treatment Minutes:  50    Total Treatment Minutes:  55    Treatment/Activity Tolerance:  [x] Patient tolerated treatment well [] Patient limited by fatigue  [] Patient limited by pain  [] Patient limited by other medical complications  [x] Other: Good tolerance for progression of exercises today. Better balance with lofstrands however pt requires cue to maintain even pace for safety. Plan:   [x] Continue per plan of care [] Alter current plan (see comments)  [] Plan of care initiated [] Hold pending MD visit [] Discharge  Plan for Next Session:         Treatment Charges: Mins Units   Initial Evaluation     Re-Evaluation     Ther Exercise         TE 30 2   Manual Therapy     MT     Ther Activities        TA 20 1   Gait Training          GT     Neuro Re-education NR     Modalities     Non-Billable Service Time     Other 5    Total Time/Units 55 3     Electronically signed by:   AUDELIA Estrada  [id number]

## 2021-03-17 ENCOUNTER — HOSPITAL ENCOUNTER (OUTPATIENT)
Dept: PHYSICAL THERAPY | Age: 21
Setting detail: THERAPIES SERIES
Discharge: HOME | End: 2021-03-17
Payer: MEDICAID

## 2021-03-17 PROCEDURE — 97116 GAIT TRAINING THERAPY: CPT

## 2021-03-17 PROCEDURE — 97110 THERAPEUTIC EXERCISES: CPT

## 2021-03-17 NOTE — PROGRESS NOTES
[facility]  Phone: [phone] Fax: [phone]       Physical Therapy Daily Treatment Note  Date:  3/17/2021    Patient Name:  Rachel Swann    :  2000  MRN: [id number]    Restrictions/Precautions:    Diagnosis:  Traumatic Left AKA Pelvic Fx   Treatment Diagnosis:    Insurance/Certification information:  48 units each [id number]  Referring Physician:  Dr Debby Rice of care signed (Y/N):    Visit# / total visits: 5/10  Pain level: /10  Time In: 9:00   (10/48 48145, 3/48 95599,  42900)  Time Out: 9:45        Subjective:  Pt has no new c/o today. Exercises:  Exercise/Equipment Resistance/Repetitions Other comments     Seated flex with ball      UBE 6 min fwd/bckwd   SKTC  AA 10 x                        nt d/t pain w/ pt attempt   LTR   10 x                              nt AA   Bridge 10 reps                         nt    R side lying hip abd 10 reps 2 sets (L)      Sidelying hip ext str left  10\" 6 x             AA hip ext 10 x Seated push ups on mat 2 x 10 dumbbells to push             Seated  Cybex 360 pulldown   3 pl 15 x 2 In chair   Hi-Lo rows 3 pl 12 x 2     Dumbbell press    5# 10 reps on ball  nt        Sit to stand from 24\" box 2 x 5 nt   Static standing lt man resist 5 x nt               Reaching RUE multi-level nt                                        Other Therapeutic Activities: Pt ambulated 220' using lofstrand crutches and CGA with gait belt with 2 standing rest breaks. 2 LOB which requires CG>min A to correct. Brother followed with wheelchair. Cued pt to avoid stepping too far beyond crutches in order to maintain balance. Repeated cue for pt to manage his pace for safety. Home Exercise Program: (3/8/2021) Instructed pt in isometric left hip abd using a pillow at home     Manual Treatments:      Modalities: .   NT per pt    Timed Code Treatment Minutes:  45    Total Treatment Minutes:  45    Treatment/Activity

## 2021-03-18 ENCOUNTER — OFFICE VISIT (OUTPATIENT)
Dept: ORTHOPEDIC SURGERY | Age: 21
End: 2021-03-18
Payer: MEDICAID

## 2021-03-18 VITALS — WEIGHT: 197 LBS | HEIGHT: 72 IN | RESPIRATION RATE: 18 BRPM | BODY MASS INDEX: 26.68 KG/M2

## 2021-03-18 DIAGNOSIS — M79.641 RIGHT HAND PAIN: Primary | ICD-10-CM

## 2021-03-18 DIAGNOSIS — M24.541 CONTRACTURE OF RIGHT THUMB JOINT: ICD-10-CM

## 2021-03-18 DIAGNOSIS — G56.11 RIGHT MEDIAN NERVE NEUROPATHY: ICD-10-CM

## 2021-03-18 PROCEDURE — G8484 FLU IMMUNIZE NO ADMIN: HCPCS | Performed by: ORTHOPAEDIC SURGERY

## 2021-03-18 PROCEDURE — G8427 DOCREV CUR MEDS BY ELIG CLIN: HCPCS | Performed by: ORTHOPAEDIC SURGERY

## 2021-03-18 PROCEDURE — 99204 OFFICE O/P NEW MOD 45 MIN: CPT | Performed by: ORTHOPAEDIC SURGERY

## 2021-03-18 PROCEDURE — 1036F TOBACCO NON-USER: CPT | Performed by: ORTHOPAEDIC SURGERY

## 2021-03-18 PROCEDURE — G8417 CALC BMI ABV UP PARAM F/U: HCPCS | Performed by: ORTHOPAEDIC SURGERY
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
## 2021-03-19 DIAGNOSIS — M24.541 CONTRACTURE OF RIGHT THUMB JOINT: Primary | ICD-10-CM

## 2021-03-23 ENCOUNTER — HOSPITAL ENCOUNTER (OUTPATIENT)
Dept: PHYSICAL THERAPY | Age: 21
Setting detail: THERAPIES SERIES
Discharge: HOME | End: 2021-03-23
Payer: MEDICAID

## 2021-03-23 PROCEDURE — 97530 THERAPEUTIC ACTIVITIES: CPT

## 2021-03-23 PROCEDURE — 97110 THERAPEUTIC EXERCISES: CPT

## 2021-03-23 NOTE — PROGRESS NOTES
[facility]  Phone: [phone] Fax: [phone]       Physical Therapy Daily Treatment Note  Date:  3/23/2021    Patient Name:  Yogesh Aguilar    :  2000  MRN: [id number]    Restrictions/Precautions:    Diagnosis:  Traumatic Left AKA Pelvic Fx   Treatment Diagnosis:    Insurance/Certification information:  48 units each [id number]  Referring Physician:  Dr Hillary Heck of care signed (Y/N):    Visit# / total visits: 6/10  Pain level: /10  Time In: 13:00   ( 06683,  17779,  77665)  Time Out: 14:00        Subjective:  Pt ambulates from parking area into clinic using loDunwellotrand crutches he purchased. States he has been using them at home without issues. Exercises:  Exercise/Equipment Resistance/Repetitions Other comments        UBE 10 min fwd/bckwd   SKTC  AA 10 x                        nt d/t pain w/ pt attempt   LTR   10 x                              nt AA   Bridge 10 reps                         nt    R side lying hip abd 12 reps 2 sets (L)      Sidelying hip ext str left  10\" 6 x        AA hip flex>ext 10 x 2 Seated push ups on mat 2 x 10 dumbbells to push             Seated  Cybex 360 pulldown   3 pl 15 x 2 In chair   Hi-Lo rows 3 pl 15 x 2    Tricep ext 2 pl 2 x 15         Sit to stand from 24\" box 2 x 5    Standing w/1 crutch    reach to hand/take med ball 1 kg multi level  1 rest break                                           Other Therapeutic Activities: Pt ambulated out to car (~100') using loftstrand crutches and close SBA. Car transfer w/S. Home Exercise Program: (3/8/2021) Instructed pt in isometric left hip abd using a pillow at home     Manual Treatments:      Modalities: .   NT per pt    Timed Code Treatment Minutes:  55    Total Treatment Minutes:  60    Treatment/Activity Tolerance:  [x] Patient tolerated treatment well [] Patient limited by fatigue  [] Patient limited by pain  [] Patient limited by other medical complications  [x] Other: Better balance ambulating with crutches today. Improvement noted in overall endurance for exercise today. Plan:   [x] Continue per plan of care [] Alter current plan (see comments)  [] Plan of care initiated [] Hold pending MD visit [] Discharge  Plan for Next Session:         Treatment Charges: Mins Units   Initial Evaluation     Re-Evaluation     Ther Exercise         TE 35 2   Manual Therapy     MT     Ther Activities        TA 20 1   Gait Training          GT     Neuro Re-education NR     Modalities     Non-Billable Service Time     Other 5    Total Time/Units 60 3     Electronically signed by:   AUDELIA Martinez  [id number]

## 2021-03-27 ENCOUNTER — HOSPITAL ENCOUNTER (OUTPATIENT)
Dept: PHYSICAL THERAPY | Age: 21
Setting detail: THERAPIES SERIES
Discharge: HOME | End: 2021-03-27
Payer: MEDICAID

## 2021-03-27 PROCEDURE — 97530 THERAPEUTIC ACTIVITIES: CPT

## 2021-03-27 PROCEDURE — 97110 THERAPEUTIC EXERCISES: CPT

## 2021-03-27 NOTE — PROGRESS NOTES
[facility]  Phone: [phone] Fax: [phone]       Physical Therapy Daily Treatment Note  Date:  3/27/2021    Patient Name:  Harjinder Rodarte    :  2000  MRN: [id number]    Restrictions/Precautions:    Diagnosis:  Traumatic Left AKA Pelvic Fx   Treatment Diagnosis:    Insurance/Certification information:  48 units each [id number]  Referring Physician:  Dr Ramila Heredia of care signed (Y/N):    Visit# / total visits: 7/10  Pain level: /10  Time In: 9:00  ( 26143,  73198,  81804)  Time Out: 9:45        Subjective:  Pt ambulates from parking area into clinic using [employer] crutches. Has no new c/o. Exercises:  Exercise/Equipment Resistance/Repetitions Other comments        UBE 10 min fwd/bckwd   SKTC  AA 10 x                        nt d/t pain w/ pt attempt   LTR   10 x                              nt AA   Bridge 10 reps                          Bolster under LLE   R side lying hip abd 2# 10 reps 2 sets (L)      Sidelying hip ext str left  10\" 6 x        AA hip flex>ext 10 x 2 Seated push ups on mat 2 x 10   UE only dumbbells to push                  Sit to stand from 24\" box 10 x    Static standing  1 min light touch> no touch                                       Other Therapeutic Activities:    Home Exercise Program: (3/8/2021) Instructed pt in isometric left hip abd using a pillow at home     Manual Treatments:      Modalities: .   NT per pt    Timed Code Treatment Minutes:  45    Total Treatment Minutes:  45    Treatment/Activity Tolerance:  [x] Patient tolerated treatment well [] Patient limited by fatigue  [] Patient limited by pain  [] Patient limited by other medical complications  [] Other:     Plan:   [x] Continue per plan of care [] Alter current plan (see comments)  [] Plan of care initiated [] Hold pending MD visit [] Discharge  Plan for Next Session:         Treatment Charges: Mins Units   Initial Evaluation     Re-Evaluation Ther Exercise         TE 35 2   Manual Therapy     MT     Ther Activities        TA 10 1   Gait Training          GT     Neuro Re-education NR     Modalities     Non-Billable Service Time     Other     Total Time/Units 45 3     Electronically signed by:   AUDELIA Martinez  [id number]

## 2021-03-31 ENCOUNTER — HOSPITAL ENCOUNTER (OUTPATIENT)
Dept: PHYSICAL THERAPY | Age: 21
Setting detail: THERAPIES SERIES
Discharge: HOME | End: 2021-03-31
Payer: MEDICAID

## 2021-03-31 NOTE — FLOWSHEET NOTE
[facility]  Phone: [phone] Fax: [phone]     Physical Therapy  Cancellation/No-show Note  Patient Name:  James White  :  2000   Date:  3/31/2021    For today's appointment patient:  [x]  Cancelled  []  Rescheduled appointment  []  No-show     Reason given by patient:  []  Patient ill  []  Conflicting appointment  []  No transportation    []  Conflict with work  [x]  No reason given  []  Other:     Comments:  Pt's cousin called to cancel his appointment. Requested she have him call to reschedule as she was uncertain of when he can come next week. Electronically signed by:   Gerald Miguel

## 2021-04-06 ENCOUNTER — HOSPITAL ENCOUNTER (OUTPATIENT)
Dept: PHYSICAL THERAPY | Age: 21
Setting detail: THERAPIES SERIES
Discharge: HOME | End: 2021-04-06
Payer: MEDICAID

## 2021-04-06 DIAGNOSIS — M24.541 CONTRACTURE OF RIGHT THUMB JOINT: Primary | ICD-10-CM

## 2021-04-06 PROCEDURE — 97110 THERAPEUTIC EXERCISES: CPT

## 2021-04-06 NOTE — PROGRESS NOTES
35    Treatment/Activity Tolerance:  [x] Patient tolerated treatment well [] Patient limited by fatigue  [] Patient limited by pain  [] Patient limited by other medical complications  [x] Other: Decreased endurance for exercise today. Pt states he is performing HEP. Again explained the goal for increased ROM and strength in left hip in preparation for his prosthesis. Plan:   [x] Continue per plan of care [] Alter current plan (see comments)  [] Plan of care initiated [] Hold pending MD visit [] Discharge  Plan for Next Session:         Treatment Charges: Mins Units   Initial Evaluation     Re-Evaluation     Ther Exercise         TE 30 2   Manual Therapy     MT     Ther Activities        TA 5 -   Gait Training          GT     Neuro Re-education NR     Modalities     Non-Billable Service Time     Other     Total Time/Units 35 2     Electronically signed by:   AUDELIA Powell  [id number]

## 2021-04-14 ENCOUNTER — HOSPITAL ENCOUNTER (OUTPATIENT)
Dept: PHYSICAL THERAPY | Age: 21
Setting detail: THERAPIES SERIES
Discharge: HOME | End: 2021-04-14
Payer: MEDICAID

## 2021-04-14 PROCEDURE — 97110 THERAPEUTIC EXERCISES: CPT

## 2021-04-14 PROCEDURE — 97530 THERAPEUTIC ACTIVITIES: CPT

## 2021-04-14 NOTE — PROGRESS NOTES
[facility]  Phone: [phone] Fax: [phone]       Physical Therapy Daily Treatment Note  Date:  2021    Patient Name:  Beata Bal    :  2000  MRN: [id number]    Restrictions/Precautions:    Diagnosis:  Traumatic Left AKA Pelvic Fx   Treatment Diagnosis:    Insurance/Certification information:  48 units each [id number]  Referring Physician:  Dr John Rodrigues of care signed (Y/N):    Visit# / total visits: 9/10  Pain level: /10  Time In: 13:10  ( 59923,  70667,  21575)  Time Out: 13:40        Subjective:  Pt has no new c/o this afternoon. Exercises:  Exercise/Equipment Resistance/Repetitions Other comments        UBE 10 min                         nt fwd/bckwd   SKTC  AA 10 x                        nt d/t pain w/ pt attempt   LTR   10 x                              nt AA   Bridge 5 reps x 2                         Bolster under LLE   R side lying hip abd 2# 10 reps x 2  (L)    Sidelying hip ext str left  10\" 6 x        AA hip flex>ext 10 x  Seated push ups on mat 3 x 10   UE only dumbbells to push                  Sit to stand from 24\" box 10 x                                            Other Therapeutic Activities: Attempted standing on RLE and reaching for ball which pt had difficulty with. Also attempted to hold ball in standing however pt unable to maintain standing balance without UE assist.     Home Exercise Program: (3/8/2021) Instructed pt in isometric left hip abd using a pillow at home     Manual Treatments:      Modalities: .      Timed Code Treatment Minutes:  30    Total Treatment Minutes:  30    Treatment/Activity Tolerance:  [x] Patient tolerated treatment well [] Patient limited by fatigue  [] Patient limited by pain  [] Patient limited by other medical complications  [x] Other:Continue to encourage pt to perform HEP. Left hip extension is still limited to neutral after stretching.    Pt reported fatigue with activities today which he related to fasting for Ramadan. Voiced he is able to continue therapy. Plan:   [x] Continue per plan of care [] Alter current plan (see comments)  [] Plan of care initiated [] Hold pending MD visit [] Discharge  Plan for Next Session:         Treatment Charges: Mins Units   Initial Evaluation     Re-Evaluation     Ther Exercise         TE 20 1   Manual Therapy     MT     Ther Activities        TA 10 1   Gait Training          GT     Neuro Re-education NR     Modalities     Non-Billable Service Time     Other     Total Time/Units 30 2     Electronically signed by:   AUDELIA Mcgowan  [id number]

## 2021-04-28 ENCOUNTER — HOSPITAL ENCOUNTER (OUTPATIENT)
Dept: PHYSICAL THERAPY | Age: 21
Setting detail: THERAPIES SERIES
Discharge: HOME | End: 2021-04-28
Payer: MEDICAID

## 2021-04-28 ENCOUNTER — TELEPHONE (OUTPATIENT)
Dept: ORTHOPEDIC SURGERY | Age: 21
End: 2021-04-28

## 2021-04-28 PROCEDURE — 97110 THERAPEUTIC EXERCISES: CPT

## 2021-04-28 PROCEDURE — 97530 THERAPEUTIC ACTIVITIES: CPT

## 2021-04-28 NOTE — PROGRESS NOTES
[facility]  Phone: [phone] Fax: [phone]       Physical Therapy Daily Treatment Note  Date:  2021    Patient Name:  Jeffery Adorno    :  2000  MRN: [id number]    Restrictions/Precautions:    Diagnosis:  Traumatic Left AKA Pelvic Fx   Treatment Diagnosis:    Insurance/Certification information:  48 units each [id number]  Referring Physician:  Dr Roblero  of care signed (Y/N):    Visit# / total visits: 10/10  Pain level: /10  Time In: 13:10  ( 95445,  62633,  15153)  Time Out: 13:40        Subjective:  Pt has no new c/o this afternoon. He is scheduled for hand surgery in 2 weeks. His prosthesis has been ordered however not received yet. Exercises:  Exercise/Equipment Resistance/Repetitions Other comments        UBE 10 min                         nt fwd/bckwd   SKTC  AA 10 x                        nt d/t pain w/ pt attempt   LTR   10 x                              nt AA   Bridge 5 reps x 2                     nt    Bolster under LLE   R side lying hip abd  10 reps   (L)    Sidelying hip ext str left  10\" 6 x        AA hip flex>ext 10 x  Seated push ups on mat 3 x 10   UE only             nt dumbbells to push   Prone hip flexor stretch 2 min              Sit to stand from 24\" box 10 x                                  nt                                            Other Therapeutic Activities: In preparation for patient's hand surgery, instructed him in ambulation using a wheeled walker and right forearm platform attachment. Pt ambulated 100' with SBA (w/gait belt for safety). Practiced with open hand to simulate postop position. He demonstrated good technique and balance. Good endurance. Called Dr. López Alvarenga office to request a prescription for a forearm attachment for his walker he has at home.      Home Exercise Program: (3/8/2021) Instructed pt in isometric left hip abd using a pillow at home     Manual Treatments: Modalities: .      Timed Code Treatment Minutes:  30    Total Treatment Minutes:  30    Treatment/Activity Tolerance:  [x] Patient tolerated treatment well [] Patient limited by fatigue  [] Patient limited by pain  [] Patient limited by other medical complications  [x] Other:Continue to encourage pt to perform HEP. Good adjustment to using ww with forearm platform attachment. Plan:   [x] Continue per plan of care [] Alter current plan (see comments)  [] Plan of care initiated [] Hold pending MD visit [] Discharge  Plan for Next Session: Per discussion with PT and patient, will call pt when script received for walker attachment and arrange to have it put on his walker prior to surgery. Treatment Charges: Mins Units   Initial Evaluation     Re-Evaluation     Ther Exercise         TE 20 1   Manual Therapy     MT     Ther Activities        TA 10 1   Gait Training          GT     Neuro Re-education NR     Modalities     Non-Billable Service Time     Other     Total Time/Units 30 2     Electronically signed by:   AUDELIA Powell  [phone]

## 2021-04-28 NOTE — TELEPHONE ENCOUNTER
Order faxed at this time. Call returned to [facility] to notify of order.

## 2021-04-28 NOTE — TELEPHONE ENCOUNTER
Rosa Maria, physical therapist, called office requesting order for R platform attachment for walker. Patient is undergoing R CTR 05/12/21 with Dr. Bharathi Vergara. Order pended and routed at this time.   Fax to [phone]  Ph. [phone]    Future Appointments   Date Time Provider Len Nair   5/13/2021  2:00 PM WILNER Garcia OT   5/20/2021  3:20 PM Maryann Greenfield MD [facility]

## 2021-04-29 ENCOUNTER — TELEPHONE (OUTPATIENT)
Dept: ORTHOPEDIC SURGERY | Age: 21
End: 2021-04-29

## 2021-04-29 DIAGNOSIS — S88.912D TRAUMATIC AMPUTATION OF LEFT LOWER EXTREMITY, SUBSEQUENT ENCOUNTER (HCC): Primary | ICD-10-CM

## 2021-04-29 NOTE — TELEPHONE ENCOUNTER
Received call from Avni Call with [facility] regarding wheelchair order that is about to . Original letter of medical necessity was for 6 months. New prescription needed for continued use of wheelchair. Status post Traumatic Amputation of Left lower extremity 2020. Wheelchair order pended and routed for decision and signature.

## 2021-05-06 RX ORDER — NICOTINE POLACRILEX 2 MG
GUM BUCCAL
COMMUNITY

## 2021-05-06 RX ORDER — ACETAMINOPHEN 160 MG
TABLET,DISINTEGRATING ORAL
COMMUNITY

## 2021-05-07 ENCOUNTER — HOSPITAL ENCOUNTER (OUTPATIENT)
Age: 21
Discharge: HOME | End: 2021-05-09
Payer: MEDICAID

## 2021-05-07 DIAGNOSIS — Z01.818 PREOP TESTING: ICD-10-CM

## 2021-05-07 PROCEDURE — U0005 INFEC AGEN DETEC AMPLI PROBE: HCPCS

## 2021-05-07 PROCEDURE — U0003 INFECTIOUS AGENT DETECTION BY NUCLEIC ACID (DNA OR RNA); SEVERE ACUTE RESPIRATORY SYNDROME CORONAVIRUS 2 (SARS-COV-2) (CORONAVIRUS DISEASE [COVID-19]), AMPLIFIED PROBE TECHNIQUE, MAKING USE OF HIGH THROUGHPUT TECHNOLOGIES AS DESCRIBED BY CMS-2020-01-R: HCPCS

## 2021-05-08 LAB
SARS-COV-2: NOT DETECTED
SOURCE: NORMAL

## 2021-05-10 ENCOUNTER — PREP FOR PROCEDURE (OUTPATIENT)
Dept: ORTHOPEDIC SURGERY | Age: 21
End: 2021-05-10

## 2021-05-10 RX ORDER — SODIUM CHLORIDE 0.9 % (FLUSH) 0.9 %
5-40 SYRINGE (ML) INJECTION EVERY 12 HOURS SCHEDULED
Status: CANCELLED | OUTPATIENT
Start: 2021-05-10

## 2021-05-10 RX ORDER — SODIUM CHLORIDE 9 MG/ML
25 INJECTION, SOLUTION INTRAVENOUS PRN
Status: CANCELLED | OUTPATIENT
Start: 2021-05-10

## 2021-05-10 RX ORDER — SODIUM CHLORIDE 9 MG/ML
INJECTION, SOLUTION INTRAVENOUS CONTINUOUS
Status: CANCELLED | OUTPATIENT
Start: 2021-05-10

## 2021-05-10 RX ORDER — SODIUM CHLORIDE 0.9 % (FLUSH) 0.9 %
5-40 SYRINGE (ML) INJECTION PRN
Status: CANCELLED | OUTPATIENT
Start: 2021-05-10

## 2021-05-12 ENCOUNTER — APPOINTMENT (OUTPATIENT)
Dept: GENERAL RADIOLOGY | Age: 21
End: 2021-05-12
Attending: ORTHOPAEDIC SURGERY
Payer: MEDICAID

## 2021-05-12 ENCOUNTER — HOSPITAL ENCOUNTER (OUTPATIENT)
Age: 21
Setting detail: OUTPATIENT SURGERY
Discharge: HOME | End: 2021-05-12
Attending: ORTHOPAEDIC SURGERY | Admitting: ORTHOPAEDIC SURGERY
Payer: MEDICAID

## 2021-05-12 ENCOUNTER — ANESTHESIA (OUTPATIENT)
Dept: OPERATING ROOM | Age: 21
End: 2021-05-12
Payer: MEDICAID

## 2021-05-12 ENCOUNTER — ANESTHESIA EVENT (OUTPATIENT)
Dept: OPERATING ROOM | Age: 21
End: 2021-05-12
Payer: MEDICAID

## 2021-05-12 VITALS
HEIGHT: 72 IN | SYSTOLIC BLOOD PRESSURE: 138 MMHG | HEART RATE: 97 BPM | DIASTOLIC BLOOD PRESSURE: 71 MMHG | BODY MASS INDEX: 30.48 KG/M2 | WEIGHT: 225 LBS | RESPIRATION RATE: 14 BRPM | OXYGEN SATURATION: 99 % | TEMPERATURE: 97.6 F

## 2021-05-12 VITALS
SYSTOLIC BLOOD PRESSURE: 103 MMHG | DIASTOLIC BLOOD PRESSURE: 53 MMHG | OXYGEN SATURATION: 99 % | RESPIRATION RATE: 17 BRPM

## 2021-05-12 DIAGNOSIS — Z01.818 PREOP TESTING: Primary | ICD-10-CM

## 2021-05-12 DIAGNOSIS — G56.02 LEFT CARPAL TUNNEL SYNDROME: ICD-10-CM

## 2021-05-12 PROCEDURE — 26520 RELEASE KNUCKLE CONTRACTURE: CPT | Performed by: ORTHOPAEDIC SURGERY

## 2021-05-12 PROCEDURE — 6360000002 HC RX W HCPCS: Performed by: NURSE ANESTHETIST, CERTIFIED REGISTERED

## 2021-05-12 PROCEDURE — 7100000011 HC PHASE II RECOVERY - ADDTL 15 MIN: Performed by: ORTHOPAEDIC SURGERY

## 2021-05-12 PROCEDURE — 2580000003 HC RX 258: Performed by: NURSE ANESTHETIST, CERTIFIED REGISTERED

## 2021-05-12 PROCEDURE — 6370000000 HC RX 637 (ALT 250 FOR IP): Performed by: ANESTHESIOLOGY

## 2021-05-12 PROCEDURE — 2500000003 HC RX 250 WO HCPCS: Performed by: NURSE ANESTHETIST, CERTIFIED REGISTERED

## 2021-05-12 PROCEDURE — 7100000010 HC PHASE II RECOVERY - FIRST 15 MIN: Performed by: ORTHOPAEDIC SURGERY

## 2021-05-12 PROCEDURE — 3600000002 HC SURGERY LEVEL 2 BASE: Performed by: ORTHOPAEDIC SURGERY

## 2021-05-12 PROCEDURE — 3600000012 HC SURGERY LEVEL 2 ADDTL 15MIN: Performed by: ORTHOPAEDIC SURGERY

## 2021-05-12 PROCEDURE — 2709999900 HC NON-CHARGEABLE SUPPLY: Performed by: ORTHOPAEDIC SURGERY

## 2021-05-12 PROCEDURE — 3700000000 HC ANESTHESIA ATTENDED CARE: Performed by: ORTHOPAEDIC SURGERY

## 2021-05-12 PROCEDURE — 6360000002 HC RX W HCPCS: Performed by: ORTHOPAEDIC SURGERY

## 2021-05-12 PROCEDURE — 2500000003 HC RX 250 WO HCPCS: Performed by: ORTHOPAEDIC SURGERY

## 2021-05-12 PROCEDURE — 64721 CARPAL TUNNEL SURGERY: CPT | Performed by: ORTHOPAEDIC SURGERY

## 2021-05-12 PROCEDURE — 3700000001 HC ADD 15 MINUTES (ANESTHESIA): Performed by: ORTHOPAEDIC SURGERY

## 2021-05-12 RX ORDER — SODIUM CHLORIDE 9 MG/ML
25 INJECTION, SOLUTION INTRAVENOUS PRN
Status: DISCONTINUED | OUTPATIENT
Start: 2021-05-12 | End: 2021-05-12 | Stop reason: HOSPADM

## 2021-05-12 RX ORDER — HYDROCODONE BITARTRATE AND ACETAMINOPHEN 5; 325 MG/1; MG/1
1 TABLET ORAL EVERY 6 HOURS PRN
Qty: 12 TABLET | Refills: 0 | Status: SHIPPED | OUTPATIENT
Start: 2021-05-12 | End: 2021-05-15

## 2021-05-12 RX ORDER — ONDANSETRON 2 MG/ML
INJECTION INTRAMUSCULAR; INTRAVENOUS PRN
Status: DISCONTINUED | OUTPATIENT
Start: 2021-05-12 | End: 2021-05-12 | Stop reason: SDUPTHER

## 2021-05-12 RX ORDER — SODIUM CHLORIDE 0.9 % (FLUSH) 0.9 %
5-40 SYRINGE (ML) INJECTION PRN
Status: DISCONTINUED | OUTPATIENT
Start: 2021-05-12 | End: 2021-05-12 | Stop reason: HOSPADM

## 2021-05-12 RX ORDER — SODIUM CHLORIDE 9 MG/ML
INJECTION, SOLUTION INTRAVENOUS CONTINUOUS PRN
Status: DISCONTINUED | OUTPATIENT
Start: 2021-05-12 | End: 2021-05-12 | Stop reason: SDUPTHER

## 2021-05-12 RX ORDER — SODIUM CHLORIDE 0.9 % (FLUSH) 0.9 %
5-40 SYRINGE (ML) INJECTION EVERY 12 HOURS SCHEDULED
Status: DISCONTINUED | OUTPATIENT
Start: 2021-05-12 | End: 2021-05-12 | Stop reason: HOSPADM

## 2021-05-12 RX ORDER — FENTANYL CITRATE 50 UG/ML
INJECTION, SOLUTION INTRAMUSCULAR; INTRAVENOUS PRN
Status: DISCONTINUED | OUTPATIENT
Start: 2021-05-12 | End: 2021-05-12 | Stop reason: SDUPTHER

## 2021-05-12 RX ORDER — PROPOFOL 10 MG/ML
INJECTION, EMULSION INTRAVENOUS CONTINUOUS PRN
Status: DISCONTINUED | OUTPATIENT
Start: 2021-05-12 | End: 2021-05-12 | Stop reason: SDUPTHER

## 2021-05-12 RX ORDER — KETAMINE HYDROCHLORIDE 10 MG/ML
INJECTION, SOLUTION INTRAMUSCULAR; INTRAVENOUS PRN
Status: DISCONTINUED | OUTPATIENT
Start: 2021-05-12 | End: 2021-05-12 | Stop reason: SDUPTHER

## 2021-05-12 RX ORDER — LIDOCAINE HYDROCHLORIDE 20 MG/ML
INJECTION, SOLUTION EPIDURAL; INFILTRATION; INTRACAUDAL; PERINEURAL PRN
Status: DISCONTINUED | OUTPATIENT
Start: 2021-05-12 | End: 2021-05-12 | Stop reason: SDUPTHER

## 2021-05-12 RX ORDER — MIDAZOLAM HYDROCHLORIDE 1 MG/ML
INJECTION INTRAMUSCULAR; INTRAVENOUS PRN
Status: DISCONTINUED | OUTPATIENT
Start: 2021-05-12 | End: 2021-05-12 | Stop reason: SDUPTHER

## 2021-05-12 RX ORDER — LIDOCAINE HYDROCHLORIDE AND EPINEPHRINE 10; 10 MG/ML; UG/ML
INJECTION, SOLUTION INFILTRATION; PERINEURAL PRN
Status: DISCONTINUED | OUTPATIENT
Start: 2021-05-12 | End: 2021-05-12 | Stop reason: ALTCHOICE

## 2021-05-12 RX ORDER — DEXAMETHASONE SODIUM PHOSPHATE 4 MG/ML
INJECTION, SOLUTION INTRA-ARTICULAR; INTRALESIONAL; INTRAMUSCULAR; INTRAVENOUS; SOFT TISSUE PRN
Status: DISCONTINUED | OUTPATIENT
Start: 2021-05-12 | End: 2021-05-12 | Stop reason: SDUPTHER

## 2021-05-12 RX ORDER — HYDROCODONE BITARTRATE AND ACETAMINOPHEN 5; 325 MG/1; MG/1
1 TABLET ORAL ONCE
Status: COMPLETED | OUTPATIENT
Start: 2021-05-12 | End: 2021-05-12

## 2021-05-12 RX ORDER — SODIUM CHLORIDE 9 MG/ML
INJECTION, SOLUTION INTRAVENOUS CONTINUOUS
Status: DISCONTINUED | OUTPATIENT
Start: 2021-05-12 | End: 2021-05-12 | Stop reason: HOSPADM

## 2021-05-12 RX ADMIN — MIDAZOLAM 2 MG: 1 INJECTION INTRAMUSCULAR; INTRAVENOUS at 12:14

## 2021-05-12 RX ADMIN — HYDROCODONE BITARTRATE AND ACETAMINOPHEN 1 TABLET: 5; 325 TABLET ORAL at 13:37

## 2021-05-12 RX ADMIN — KETAMINE HYDROCHLORIDE 50 MG: 10 INJECTION INTRAMUSCULAR; INTRAVENOUS at 12:17

## 2021-05-12 RX ADMIN — Medication 2 G: at 12:14

## 2021-05-12 RX ADMIN — DEXAMETHASONE SODIUM PHOSPHATE 10 MG: 4 INJECTION, SOLUTION INTRAMUSCULAR; INTRAVENOUS at 12:20

## 2021-05-12 RX ADMIN — LIDOCAINE HYDROCHLORIDE 40 MG: 20 INJECTION, SOLUTION EPIDURAL; INFILTRATION; INTRACAUDAL; PERINEURAL at 12:17

## 2021-05-12 RX ADMIN — ONDANSETRON 4 MG: 2 INJECTION INTRAMUSCULAR; INTRAVENOUS at 12:20

## 2021-05-12 RX ADMIN — FENTANYL CITRATE 50 MCG: 50 INJECTION, SOLUTION INTRAMUSCULAR; INTRAVENOUS at 12:17

## 2021-05-12 RX ADMIN — FENTANYL CITRATE 50 MCG: 50 INJECTION, SOLUTION INTRAMUSCULAR; INTRAVENOUS at 12:25

## 2021-05-12 RX ADMIN — PROPOFOL 150 MCG/KG/MIN: 10 INJECTION, EMULSION INTRAVENOUS at 12:17

## 2021-05-12 RX ADMIN — SODIUM CHLORIDE: 9 INJECTION, SOLUTION INTRAVENOUS at 12:14

## 2021-05-12 ASSESSMENT — PULMONARY FUNCTION TESTS
PIF_VALUE: 1
PIF_VALUE: 0
PIF_VALUE: 1
PIF_VALUE: 0
PIF_VALUE: 1
PIF_VALUE: 0
PIF_VALUE: 0
PIF_VALUE: 1
PIF_VALUE: 0
PIF_VALUE: 1
PIF_VALUE: 0

## 2021-05-12 ASSESSMENT — PAIN DESCRIPTION - ORIENTATION: ORIENTATION: RIGHT

## 2021-05-12 ASSESSMENT — PAIN SCALES - GENERAL
PAINLEVEL_OUTOF10: 8
PAINLEVEL_OUTOF10: 0

## 2021-05-12 NOTE — ANESTHESIA POSTPROCEDURE EVALUATION
Department of Anesthesiology  Postprocedure Note    Patient: Nolvia Ernst  MRN: [id number]  YOB: 2000  Date of evaluation: 5/12/2021  Time:  1:15 PM     Procedure Summary     Date: 05/12/21 Room / Location: [place] [address] / [address] [place] [address]    Anesthesia Start: 1214 Anesthesia Stop: 1259    Procedures:       RIGHT CARPAL TUNNEL RELEASE (Right )      RIGHT THUMB [facility] CONTRACTURE RELEASE   ++Nepali ++ (Right ) Diagnosis: (RIGHT CARPAL TUNNEL SYNDROME RIGHT THUMB CONTRACTURE)    Surgeons: Bry Barros MD Responsible Provider: Carmita Solares MD    Anesthesia Type: MAC ASA Status: 3          Anesthesia Type: MAC    Froylan Phase I: Froylan Score: 10    Froylan Phase II:      Last vitals: Reviewed and per EMR flowsheets.        Anesthesia Post Evaluation    Patient location during evaluation: PACU  Patient participation: complete - patient participated  Level of consciousness: awake and alert  Airway patency: patent  Nausea & Vomiting: no nausea and no vomiting  Complications: no  Cardiovascular status: hemodynamically stable  Respiratory status: acceptable  Hydration status: euvolemic

## 2021-05-12 NOTE — OP NOTE
Operative Note      Patient: Jennie Barvo  YOB: 2000  MRN: [id number]    Date of Procedure: 5/12/2021    Pre-Op Diagnosis: RIGHT CARPAL TUNNEL SYNDROME RIGHT THUMB CONTRACTURE    Post-Op Diagnosis: Same       Procedure(s):  RIGHT CARPAL TUNNEL RELEASE  RIGHT THUMB [facility] CONTRACTURE RELEASE    Surgeon(s):  Kale Nevarez MD    Assistant:   Resident: Kamala Hills DO    Anesthesia: Monitor Anesthesia Care    Estimated Blood Loss (mL): Minimal    Complications: None    Specimens:   * No specimens in log *    Implants:  * No implants in log *      Drains:   Negative Pressure Wound Therapy Leg Left;Upper; Lateral (Active)       Negative Pressure Wound Therapy Leg Anterior; Upper;Left (Active)       Gastrostomy/Enterostomy/Jejunostomy Percutaneous Endoscopic Gastrostomy (PEG) LUQ 20 fr (Active)       Gastrostomy/Enterostomy/Jejunostomy Percutaneous Endoscopic Gastrostomy (PEG) (Active)       Urethral Catheter Temperature probe (Active)       Findings: see detials    Detailed Description of Procedure:   DATE OF PROCEDURE: 5/12/2021  SURGEON: Trista Hightower M.D.  ASSISTANT: Dr. Tosin Bishop orthopedic surgery resident  PREOPERATIVE DIAGNOSIS:   1. Right carpal tunnel syndrome. 2. Right thumb post-traumatic contracture    POSTOPERATIVE DIAGNOSIS: same    OPERATION:   1. right carpal tunnel release. 2. Right thumb carpometacarpal joint contracture release    ANESTHESIA:  1. Monitored anesthesia care  2. Local anesthetic by surgeon consisting of 10cc of 1% lidocaine with epinephrine  ESTIMATED BLOOD LOSS: Minimal  COMPLICATIONS: None. TOTAL TOURNIQUET TIME: Approximately 17 minutes, 250 mm brachial tourniquet. DISPOSITION: The patient was stable throughout the procedure. FINDINGS:  1. Evidence of median nerve compression beneath the transverse carpal  ligament. It was adequately decompressed with release of the transverse  carpal ligament.   2. Status post decompression, the median nerve was fully decompressed  throughout its course including distal forearm fascia through the carpal  tunnel to its trifurcation distally  3.  Release of the thumb carpometacarpal joint did not result in any significant thumb extension. However radial ulnar deviation was markedly improved. There did appear to be a contracture of the volar soft tissues. OPERATIVE INDICATION: The patient is a very pleasant patient with  persistent and recalcitrant of carpal tunnel syndrome. After failing  conservative management, she wished to proceed with carpal tunnel release. Risks, benefits, alternatives, and complications were fully explained to her  including, but not limited to, the risk of infection, damage to  nerves/vessels/tendons, failure to relieve her symptoms, worsening symptoms,  recurrence of symptoms, pillar pain, thenar pain, hypothenar pain, scar sensitivity as well as unforeseen complications. She voiced her understanding and wished to proceed. PROCEDURE IN DETAIL: The patient was identified in the holding area. The  right hand was identified as the operative site. She was then seen by  Anesthesia, taken to the operating room, placed supine on the table, and  underwent monitored anesthesia care per Anesthesia Department. Under sterile  conditions, approximately 10 mL of 1:1 mixture of 1% lidocaine with epinephrine were infiltrated over the surgical site. With thisaccomplished, a well-padded arm tourniquet was placed. The right upperextremity was prepared and draped in standard sterile fashion. The arm was exsanguinated and the tourniquet was inflated to 250 mmHg. An incision in line with the radial border of the ring finger, extending from  Butlre's cardinal line to within 1 cm of the volar wrist flexion crease was  then created followed by blunt dissection and identification of the  superficial palmar fascia, underlying median nerve, and transverse carpal  ligament.  Dissection was performed proximally to identify the contents of  Guyon canal, which was reflected off of the distal forearm fascia and  transverse carpal ligament. The transverse carpal ligament was then clearly  identified and released from a proximal to distal direction, decompressing  the carpal tunnel. The median nerve was inspected. There was flattening and  hyperemia to the nerve, consistent with median nerve compression. The  remaining portion of the proximal transverse carpal ligament and the distal  forearm fascia was then released using blunt-tip Metzenbaum scissors with a  distal to proximal slide technique while visualizing and protecting the  underlying median nerve. The median nerve was fully decompressed  throughout its visualized course, including the distal forearm fascia,  through the carpal tunnel and to its level of trifurcation distally. Attention was then directed was the thumb contracture. A Param approach was undertaken incising the skin between the glabrous and dorsal skin of the thumb. The thenar musculature was reflected palmarly to expose the carpometacarpal joint protecting the sensory nerve branches. The joint was then entered and extended dorsally and volarly releasing the capsule and excised in the dorsal and volar capsule. Dissection was performed from radial to ulnar. This markedly improved radial ulnar deviation of the thumb basal joint. However there did not have any significant improvement in extension as there did appear to be tightness of the volar soft tissues. As the release of the structures was not discussed in detail with the patient preoperatively no further dissection was undertaken at this time. The wound was copiously irrigated out. With this accomplished, the tourniquet was deflated and hemostasis was achieved with bipolar cautery. The wound was copiously irrigated out. The skin was closed with nylon suture followed by a soft sterile dressing.         Electronically signed by Kale Nevarez MD on 5/12/2021 at 4:16 PM

## 2021-05-12 NOTE — BRIEF OP NOTE
Brief Postoperative Note      Patient: Beata Bal  YOB: 2000  MRN: [id number]    Date of Procedure: 5/12/2021    Pre-Op Diagnosis: RIGHT CARPAL TUNNEL SYNDROME RIGHT THUMB CONTRACTURE    Post-Op Diagnosis: Same       Procedure(s):  RIGHT CARPAL TUNNEL RELEASE  RIGHT THUMB [facility] CONTRACTURE RELEASE   ++Danish ++    Surgeon(s):  Trino Mc MD    Assistant:  Resident: Mitch Nolasco DO    Anesthesia: Monitor Anesthesia Care    Estimated Blood Loss (mL): Minimal    Complications: None    Specimens:   * No specimens in log *    Implants:  * No implants in log *      Drains:   Negative Pressure Wound Therapy Leg Left;Upper; Lateral (Active)       Negative Pressure Wound Therapy Leg Anterior; Upper;Left (Active)       Gastrostomy/Enterostomy/Jejunostomy Percutaneous Endoscopic Gastrostomy (PEG) LUQ 20 fr (Active)       Gastrostomy/Enterostomy/Jejunostomy Percutaneous Endoscopic Gastrostomy (PEG) (Active)       Urethral Catheter Temperature probe (Active)       Findings: see op note    Electronically signed by Mitch Nolasco DO on 5/12/2021 at 12:32 PM
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
## 2021-05-12 NOTE — PROGRESS NOTES
1258 admitted to stage 2 pacu   1310 taking po well family member is at bedside helping pt   26 medicated for discomfort   [phone] discharge instructions given to pt and his family member and both verbalized understanding of instructions  Very well   [id number] [phone] discharged into the care of his family member
would greatly appreciate your comments    [] Parent/guardian of a minor must accompany their child and remain on the premises  the entire time they are under our care     [] Pediatric patients may bring favorite toy, blanket or comfort item with them    [x] A caregiver or family member must remain with the patient during their stay if they are mentally handicapped, have dementia, disoriented or unable to use a call light or would be a safety concern if left unattended    [x] Please notify surgeon if you develop any illness between now and time of surgery (cold, cough, sore throat, fever, nausea, vomiting) or any signs of infections  including skin, wounds, and dental.    [] Other instructions    EDUCATIONAL MATERIALS PROVIDED:    [] PAT Preoperative Education Packet/Booklet     [] Medication List    [] Fluoroscopy Information Pamphlet    [] Transfusion bracelet applied with instructions    [] Joint replacement video reviewed    [] Shower with antibacterial soap and use CHG wipes provided the evening before surgery as instructed        Have you been tested for COVID  No           Have you been told you were positive for COVID No  Have you had any known exposure to someone that is positive for COVID No  Do you have a cough                   No              Do you have shortness of breath No                 Do you have a sore throat            No                Are you having chills                    No                Are you having muscle aches. No                    Please come to the hospital wearing a mask and have your significant other wear a mask as well. Both of you should check your temperature before leaving to come here,  if it is 100 or higher please call [phone] for instruction.
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
## 2021-05-18 ENCOUNTER — HOSPITAL ENCOUNTER (OUTPATIENT)
Dept: OCCUPATIONAL THERAPY | Age: 21
Setting detail: THERAPIES SERIES
Discharge: HOME | End: 2021-05-18
Payer: MEDICAID

## 2021-05-18 PROCEDURE — 97760 ORTHOTIC MGMT&TRAING 1ST ENC: CPT | Performed by: OCCUPATIONAL THERAPIST

## 2021-05-18 PROCEDURE — 97166 OT EVAL MOD COMPLEX 45 MIN: CPT | Performed by: OCCUPATIONAL THERAPIST
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
## 2021-05-19 ENCOUNTER — APPOINTMENT (OUTPATIENT)
Dept: OCCUPATIONAL THERAPY | Age: 21
End: 2021-05-19
Payer: MEDICAID

## 2021-05-20 ENCOUNTER — HOSPITAL ENCOUNTER (OUTPATIENT)
Dept: OCCUPATIONAL THERAPY | Age: 21
Setting detail: THERAPIES SERIES
Discharge: HOME | End: 2021-05-20
Payer: MEDICAID

## 2021-05-20 ENCOUNTER — OFFICE VISIT (OUTPATIENT)
Dept: ORTHOPEDIC SURGERY | Age: 21
End: 2021-05-20

## 2021-05-20 VITALS — BODY MASS INDEX: 28.44 KG/M2 | RESPIRATION RATE: 18 BRPM | HEIGHT: 72 IN | WEIGHT: 210 LBS

## 2021-05-20 DIAGNOSIS — G56.11 RIGHT MEDIAN NERVE NEUROPATHY: Primary | ICD-10-CM

## 2021-05-20 PROCEDURE — 97110 THERAPEUTIC EXERCISES: CPT | Performed by: OCCUPATIONAL THERAPIST

## 2021-05-20 PROCEDURE — 97140 MANUAL THERAPY 1/> REGIONS: CPT | Performed by: OCCUPATIONAL THERAPIST

## 2021-05-20 PROCEDURE — 99024 POSTOP FOLLOW-UP VISIT: CPT | Performed by: ORTHOPAEDIC SURGERY

## 2021-05-20 NOTE — PROGRESS NOTES
Postop right carpal tunnel release and right thumb CMC joint release. Overall he reports his hand is feeling better. He is in therapy. However he still does not have any extension to the thumb. His radial to ulnar deviation has however improved. Physical exam: Incisions healing very nicely. No signs of infection. Full digital flexion extension. Sensation intact thumb index middle ring and small fingers. He does have improved thumb radial ulnar deviation however no extension at the [facility]. Assessment: Postop carpal tunnel release and thumb CMC joint release    Plan    Today's findings were explained to patient. Discussed possible Z-plasty or contracture release of the volar soft tissues once he is fully recovered from the carpal tunnel and CMC joint. He voiced understanding. Continue with therapy. Follow-up 4-6  weeks.

## 2021-05-20 NOTE — PROGRESS NOTES
Re-education     Gait Training     Group Therapy     Non-Billable Service Time     Other     Total Time/Units 40 min 3       -Response to Treatment: Pt please with his progress. Goals: Goals for pt can be see on initial eval occurring on 5/18/2021    Plan:   [x]  Continues Plan of care: Treatment covered based on POC and graduated to patient's progress. Pt education continues at each visit to obtain maximum benefits from skilled OT intervention.   []  Alter Plan of care:   []  Discharge:      Bailey Knapp, OT /L, CHT

## 2021-05-21 ENCOUNTER — HOSPITAL ENCOUNTER (OUTPATIENT)
Dept: OCCUPATIONAL THERAPY | Age: 21
Setting detail: THERAPIES SERIES
Discharge: HOME | End: 2021-05-21
Payer: MEDICAID

## 2021-05-21 PROCEDURE — 97140 MANUAL THERAPY 1/> REGIONS: CPT | Performed by: OCCUPATIONAL THERAPIST

## 2021-05-21 PROCEDURE — 97110 THERAPEUTIC EXERCISES: CPT | Performed by: OCCUPATIONAL THERAPIST

## 2021-05-21 NOTE — PROGRESS NOTES
Occupational Therapy    OCCUPATIONAL THERAPY PROGRESS NOTE    Date:  2021                         Initial Evaluation Date: 2021    Patient Name:  Selvin Rojas    :  2000    Restrictions/Precautions:  No strengthening at this time, moderate  Fall-  pt came in in  - has L LE below knee amputee  Diagnosis:  R thumb CMC thumb contracture release         M24.541                                                Insurance/Certification information:  [employer]  Referring Practitioner:  Dr. Price Monroe  Date of Surgery/Injury: sx 2021 - injury 2020  Plan of care signed (Y/N): N  Visit# / total visits: 3/ 20    Pain Level: Low level     Subjective: Pt states that the doctors appointment went well and that his stitches are out now and feeling good     Objective:  Updated POC to be completed by visit number 10 . INTERVENTION: COMPLETED: SPECIFICS/COMMENTS:   Modality:               AROM:     Wrist all planes x Tenodesis for wrist flex. Ext,   Thumb IP/ MP flex/ext  X 2 sets of 10    Digital tendon gliding x Hook difficult - did with back of hand on table and pt curl around high lighter - to do at home. Opposition x -marbles alternating fingers picking up and putting into container   Thumb rad and palm abduc X 2 sets of 10 -picking up pom poms   -AROM exercises        AAROM:               PROM/Stretching:     Wrist all planes x    Thumb all planes x         Scar Mass/Edema Control:     Retrograde  x R hand / thumb    Thumb web space stretch x         Strengthening:               Other:     Splint  x Fitting well    HEP  x Reviewed all. Assessment/Comments: Pt is making Good progress toward stated plan of care. Pt tolerated session well with increased tolerance to ROM exercises noted this date. Limited radial abduction but continues to progress well with all other thumb motions. Will continue to progress as tolerated.      -Rehab Potential: Good  -Requires OT Follow

## 2021-05-24 ENCOUNTER — HOSPITAL ENCOUNTER (OUTPATIENT)
Dept: OCCUPATIONAL THERAPY | Age: 21
Setting detail: THERAPIES SERIES
Discharge: HOME | End: 2021-05-24
Payer: MEDICAID

## 2021-05-24 PROCEDURE — 97110 THERAPEUTIC EXERCISES: CPT | Performed by: OCCUPATIONAL THERAPIST

## 2021-05-24 PROCEDURE — 97140 MANUAL THERAPY 1/> REGIONS: CPT | Performed by: OCCUPATIONAL THERAPIST

## 2021-05-26 ENCOUNTER — HOSPITAL ENCOUNTER (OUTPATIENT)
Dept: OCCUPATIONAL THERAPY | Age: 21
Setting detail: THERAPIES SERIES
Discharge: HOME | End: 2021-05-26
Payer: MEDICAID

## 2021-05-26 PROCEDURE — 97110 THERAPEUTIC EXERCISES: CPT

## 2021-05-26 PROCEDURE — 97140 MANUAL THERAPY 1/> REGIONS: CPT

## 2021-05-26 NOTE — PROGRESS NOTES
Occupational Therapy    OCCUPATIONAL THERAPY PROGRESS NOTE    Date:  2021                         Initial Evaluation Date: 2021    Patient Name:  Milton Durant    :  2000    Restrictions/Precautions:  No strengthening at this time, moderate  Fall-  [place] - has L LE below knee amputee  Diagnosis:  R thumb CMC thumb contracture release         M24.541                                                Insurance/Certification information:  [employer]  Referring Practitioner:  Dr. Deisi Rendon  Date of Surgery/Injury: sx 2021 - injury 2020  Plan of care signed (Y/N): N  Visit# / total visits:     Pain Level: Low level     Subjective: Pt states \" Can I take a shower with my hand now? \" Therapist instructed pt he can shower and use his hand in shower now. Therapist did instruct pt not to soak his hand in a bathtub      Objective:  Updated POC to be completed by visit number 10 . INTERVENTION: COMPLETED: SPECIFICS/COMMENTS:   Modality:               AROM:     Wrist all planes for ^ing mobility 2 x 10 reps Tenodesis for wrist flex/ ext     Thumb IP/ MP flex/ext for ^ing mobility X 2 sets of 10 each    Digital tendon gliding for ^ing mobility x Hook difficult - did with back of hand on table and pt curl around high lighter - to do at home. Opposition for ^ing thumb mobility x -small marbles alternating fingers picking up and putting into container   Thumb isolated rad and palm abduction for ^ing thumb mobility X 2 sets of 10 each           AAROM:               PROM/Stretching:     Wrist all planes for ^ing mobility x    Thumb all planes for ^ing mobility x         Scar Mass/Edema Control:     Retrograde massage for decreasing swelling x R hand / thumb    Thumb web space stretch for ^ing mobility x         Strengthening:               Other:     Splint  x Fitting well- no c/o's- pt reports compliance with   HEP  x Reviewed all.       Assessment/Comments: Pt is making Good progress toward stated plan of care. Pt arrives to therapy session with his brother. Pt is motivated for tx session. Pt asks,\"Can I take a shower with my hand now? \" Therapist instructed pt he can shower and use his hand gently in shower now. Therapist did instruct pt not to soak his hand in a bathtub. Pt tolerated session well with increased ROM/decreased stiffness of thumb web space noted this date. Scar areas healing well. Edema level very minimal. Pt reports compliance with wearing his splint and taking it off at home for functional activities-video games & eating meals. Will continue to progress as tolerated focus ^ing mobility of thumb for ^ing fxl use of hand.     -Rehab Potential: Good  -Requires OT Follow Up: Yes    Time In:   11:15 am            Time Out:   12:10 pm              Visit #: 5    Units allowed through:  8/11/2021    CODE         Mins          Units Total Used/Total Authorized  [id number] Manual 25 2 7/80   94116 Therapeutic Ex 30 2 4/80   38814 Therapeutic Activity   0/80   47912 Fluidotherapy   0/80   79473 ADL/COMP Tech Train   0/80   37661 Paraffin    0/20          Total           -Response to Treatment: Good. Pt feels he is doing well. Goals: Goals for pt can be see on initial eval occurring on 5/18/2021    Plan:   [x]  Continues Plan of care: Treatment covered based on POC and graduated to patient's progress. Pt education continues at each visit to obtain maximum benefits from skilled OT intervention.   []  Alter Plan of care:   []  Discharge:      Lisa WHITE/KAVITHA, [id number]

## 2021-06-02 ENCOUNTER — HOSPITAL ENCOUNTER (OUTPATIENT)
Dept: OCCUPATIONAL THERAPY | Age: 21
Setting detail: THERAPIES SERIES
Discharge: HOME | End: 2021-06-02
Payer: MEDICAID

## 2021-06-02 PROCEDURE — 97110 THERAPEUTIC EXERCISES: CPT

## 2021-06-02 PROCEDURE — 97140 MANUAL THERAPY 1/> REGIONS: CPT

## 2021-06-02 NOTE — PROGRESS NOTES
Occupational Therapy    OCCUPATIONAL THERAPY PROGRESS/ REASSESSMENT NOTE    Date:  2021                         Initial Evaluation Date: 2021    Patient Name:  Cay Kayser    :  2000    Restrictions/Precautions:  No strengthening at this time, moderate  Fall-  [place] - has L LE below knee amputee  Diagnosis:  R thumb CMC thumb contracture release         M24.541                                                Insurance/Certification information:  [employer]  Referring Practitioner:  Dr. Awa Carcamo  Date of Surgery/Injury: sx 2021 - injury 2020  Plan of care signed (Y/N): N  Visit# / total visits:     Pain Level: Very low level achiness reported     Subjective: Pt arrives to therapy 3 wks post op stating \" It hasn't bled from the scar since last week. Its all closed up now. \"     Objective:  Updated POC completed today 21. INTERVENTION: COMPLETED: SPECIFICS/COMMENTS:   Modality:               AROM:     Wrist all planes for ^ing mobility 2 x 10 reps Tenodesis for wrist flex/ ext     Thumb IP/ MP/ [facility] flex/ext for ^ing mobility  2 sets of 10 each    Digital tendon gliding for ^ing mobility 2 x 10 reps Hook difficult - did with back of hand on table and pt curl around high lighter - at home also    Opposition for ^ing thumb mobility/decreasing stiffness 3 x 5 reps Opposition full   Thumb isolated rad and palm abduction for ^ing thumb mobility X 2 sets of 10 each Decreasing stiffness noted        AAROM:     Wrist all planes for ^ing mobility X 10 reps    Thumb IP/ MP/ [facility] flex/ext for ^ing mobility X 10 reps each Focused isolated IP thumb flex & extension with CMC/MP jts pulled into extension stretch. Therapist instructed pt to add to HEP. Pt displays good understanding.    PROM/Stretching:     Wrist all planes for ^ing mobility x Intermittently with prolonged holds   Thumb all planes focus web space for ^ing mobility x Intermittently with prolonged holds focus web space- Therapist instructed pt with passive stretch to thumb by tenting fingertips over tabletop for home as well as prayer stretch for wrist & fingers combined        Scar Mass/Edema Control:     Retrograde massage for decreasing swelling x R wrist/ hand / thumb              Strengthening:               Other:     Splint  x Fitting well- strap fell off- therapist replaced- pt reports compliance with   HEP  x Reviewed all. Assessment/Comments: Pt is making Good progress toward stated plan of care. Pt arrives to therapy session with his brother. Pt is 3 weeks post op. Pt reports no discomfort c/o's and is motivated for tx session. Pt reports he lost a strap from his splint and therapist replace dorsal hand strap. Pt tolerated session well with increased ROM/decreased stiffness of thumb web space noted. Scar areas healing well/ closed over. Edema level very minimal. Therapist instructed pt with passive stretch to thumb by tenting fingertips over tabletop for home as well as prayer stretch for wrist & fingers combined. Therapist also instructed pt with isolated IP thumb flex & extension with CMC/MP jts pulled into extension stretch. Therapist instructed pt to add both ex's to HEP. Pt displays good understanding. Pt reports compliance with wearing his splint and taking it off at home for functional activities-video games & eating meals. Therapist completed a reassessment with improvements in mobility noted below. Will continue to progress as tolerated focus ^ing mobility of thumb for ^ing fxl use of hand. 6/02/21 Reassessment  Right  Upper Extremity  AROM           IE 6/02/21  WRIST Flexion 0-70* 60*  ^65*         Extension 0-80* 57*  ^70*         Radial Deviation 0-20* 24* ^30*          Ulnar Deviation 0-30* 30*  30*          Right  Hand ROM    AROM [x]?          PROM[]? R  /  L   R/L       THUMB MP Flexion  14-23* H /0-50* 35*/59* ^38*/ 60*          IP Flexion  23-30* H/ 0-80* 44*/68* Brennan* /70*          Radial Abduction 53-71* 25*/63* Leigh Justo* /65*          Palmar Abduction 50-71* 44*/ 68* ^47*/ 70*             -Rehab Potential: Good  -Requires OT Follow Up: Yes    Time In:   11:05 am            Time Out:   12:10 pm              Visit #: 6    Units allowed through:  8/11/2021    CODE         Mins          Units Total Used/Total Authorized  [id number] Manual 35 2 9/80   80581 Therapeutic Ex 30 2 6/80   36731 Therapeutic Activity   0/80   52798 Fluidotherapy   0/80   18419 ADL/COMP Tech Train   0/80   38447 Paraffin    0/20          Total           -Response to Treatment: Good. Pt feels he is doing well and his wrist & thumb are moving better      GOALS (Long term same as Short term): 6/02/21 Reassess  1. ) Patient will demonstrate good understanding of home program (exercises/activities/diagnosis/prognosis/goals) with good accuracy. Goal Met & ongoing  2. ) Patient will demonstrate increased active/passive range of motion of their R thumb, wrist to [facility] for ADL/IADL completion. Goal Progressing  3. ) Patient will demonstrate increased /pinch strength of at least 10 / 5 pinch pounds of their Right hand. Goal will be initiated when appropriate  4. ) Patient to report decreased pain in their affected Right  upper extremity from 6.5 /10 to 3/10 or less with resistive functional use. Goal Met & ongoing: Goal updated to 0-1/10 pain level to be achieved 6/02/21  5. ) Increase in fine motor function as evidenced by decreased time to complete 9-hole peg test by at least 5-10 seconds. Goal Progressing  6. ) Patient to report 100% compliance with their splint wear, care, and precautions if needed. Goal Met & ongoing  7. ) Patient will be knowledgeable of edema control techniques as evident with decreases from mild +  to mild/none. Goal Progressing  8. ) Patient will demonstrate a non-tender/non-adherent scar. Goal Progressing Well  9. ) Patient will report ADL functions as Mod I/I using R UE> .  Goal

## 2021-06-07 ENCOUNTER — HOSPITAL ENCOUNTER (OUTPATIENT)
Dept: OCCUPATIONAL THERAPY | Age: 21
Setting detail: THERAPIES SERIES
Discharge: HOME | End: 2021-06-07
Payer: MEDICAID

## 2021-06-07 PROCEDURE — 97110 THERAPEUTIC EXERCISES: CPT

## 2021-06-07 PROCEDURE — 97140 MANUAL THERAPY 1/> REGIONS: CPT

## 2021-06-07 NOTE — PROGRESS NOTES
Occupational Therapy    OCCUPATIONAL THERAPY PROGRESS NOTE    Date:  2021                         Initial Evaluation Date: 2021    Patient Name:  Maria Ines Hankins    :  2000    Restrictions/Precautions:  No strengthening at this time, moderate  Fall-  [place] - has L LE below knee amputee  Diagnosis:  R thumb CMC thumb contracture release         M24.541                                                Insurance/Certification information:  [employer]  Referring Practitioner:  Dr. Cullen Right  Date of Surgery/Injury: sx 2021 - injury 2020  Plan of care signed (Y/N): N  Visit# / total visits:     Pain Level: Very low level achiness reported     Subjective: Pt arrives to therapy,\" I've been stretching the thumb a lot. \"     Objective:  Updated POC completed today 21. INTERVENTION: COMPLETED: SPECIFICS/COMMENTS:   Modality:               AROM:     Wrist all planes for ^ing mobility 2 x 10 reps Tenodesis for wrist flex/ ext     Thumb IP/ MP/ [facility] flex/ext for ^ing mobility  2 sets of 10 each    Digital tendon gliding for ^ing mobility ^d 3 x 10 reps Hook difficult - did with back of hand on table and pt curling in - at home using a pen    Opposition for ^ing thumb mobility/decreasing stiffness 3 x 5 reps Opposition full   Thumb isolated rad and palm abduction for ^ing thumb mobility X ^d 3 sets of 10 each Decreasing stiffness noted        AAROM:     Wrist all planes for ^ing mobility X 10 reps    Thumb IP/ [facility] flex/ext for ^ing mobility ^d 3 X 10 reps each Focused isolated IP thumb flex & extension with CMC/MP jts pulled into extension stretch. Therapist instructed pt to add to HEP. Pt displays good understanding.    PROM/Stretching:     Wrist all planes for ^ing mobility x Intermittently with prolonged holds   Thumb all planes focus web space for ^ing mobility x Intermittently with prolonged holds focus web space- Therapist instructed pt with passive stretch to thumb by tenting fingertips over tabletop for home as well as prayer stretch for wrist & fingers combined        Scar Mass/Edema Control:     Retrograde massage for decreasing swelling x R wrist/ hand / thumb    Desensitization x Rolling wrist & thumb across ribbed therabar        Strengthening:     Grasp strengthening x Beige therabar for wringing, up & down bends    strengtheninig 3 x 10 reps Black hand gripper- middle resistance   Other:     Splint  x Fitting well- strap fell off- therapist replaced- pt reports compliance with   HEP  x Reviewed all. Assessment/Comments: Pt is making Good progress toward stated plan of care. Pt arrives to therapy session with his brother reporting no discomfort c/o's. Therapist added light resistive beige therabar and hand gripper ex's to tx plan today for improving fxl grasp strength. Pt tolerated session well with increased ROM/decreased stiffness of thumb web space noted. Scar areas are closed over. Edema level very minimal. Therapist instructed pt to cont with passive stretch to thumb by tenting fingertips over tabletop for home as well as prayer stretch for wrist & fingers combined. Therapist also instructed pt to cont with isolated IP thumb flex & extension with CMC/MP jts pulled into extension stretch. Pt reports compliance with wearing his splint and taking it off at home for functional activities-video games & eating meals. Will continue to progress as tolerated focus ^ing mobility of thumb for ^ing fxl use of hand. 6/02/21 Reassessment  Right  Upper Extremity  AROM           IE 6/02/21  WRIST Flexion 0-70* 60*  ^65*         Extension 0-80* 57*  ^70*         Radial Deviation 0-20* 24* ^30*          Ulnar Deviation 0-30* 30*  30*          Right  Hand ROM    AROM [x]?          PROM[]? R  /  L   R/L       THUMB MP Flexion  14-23* H /0-50* 35*/59* ^38*/ 60*          IP Flexion  23-30* H/ 0-80* 44*/68* ^09* /70*          Radial Abduction 53-71* 25*/63* Charlene Rowepatrick* Samira Mcclain         Palmar Abduction 50-71* 44*/ 68* ^47*/ 70*             -Rehab Potential: Good  -Requires OT Follow Up: Yes    Time In:   4:00 pm            Time Out:   5:05 pm              Visit #: 7    Units allowed through:  8/11/2021    CODE         Mins          Units Total Used/Total Authorized  [id number] Manual 35 2 11/80   30857 Therapeutic Ex 30 2 8/80   61092 Therapeutic Activity   0/80   62437 Fluidotherapy   0/80   30816 ADL/COMP Tech Train   0/80   45846 Paraffin    0/20          Total           -Response to Treatment: Good. GOALS (Long term same as Short term): 6/02/21 Reassess  1. ) Patient will demonstrate good understanding of home program (exercises/activities/diagnosis/prognosis/goals) with good accuracy. Goal Met & ongoing  2. ) Patient will demonstrate increased active/passive range of motion of their R thumb, wrist to [facility] for ADL/IADL completion. Goal Progressing  3. ) Patient will demonstrate increased /pinch strength of at least 10 / 5 pinch pounds of their Right hand. Goal will be initiated when appropriate  4. ) Patient to report decreased pain in their affected Right  upper extremity from 6.5 /10 to 3/10 or less with resistive functional use. Goal Met & ongoing: Goal updated to 0-1/10 pain level to be achieved 6/02/21  5. ) Increase in fine motor function as evidenced by decreased time to complete 9-hole peg test by at least 5-10 seconds. Goal Progressing  6. ) Patient to report 100% compliance with their splint wear, care, and precautions if needed. Goal Met & ongoing  7. ) Patient will be knowledgeable of edema control techniques as evident with decreases from mild +  to mild/none. Goal Progressing  8. ) Patient will demonstrate a non-tender/non-adherent scar. Goal Progressing Well  9. ) Patient will report ADL functions as Mod I/I using R UE> .  Goal Progressing  10 ) Patient will decrease QuickDASH score to 50% or less for increased participation in daily functional activities. Goal Progressing Well     Plan:   [x]  Continues Plan of care: Treatment covered based on POC and graduated to patient's progress. Pt education continues at each visit to obtain maximum benefits from skilled OT intervention.   []  Alter Plan of care:   []  Discharge:      Xavier SCHWARTZ, [id number]

## 2021-06-09 ENCOUNTER — HOSPITAL ENCOUNTER (OUTPATIENT)
Dept: OCCUPATIONAL THERAPY | Age: 21
Setting detail: THERAPIES SERIES
Discharge: HOME | End: 2021-06-09
Payer: MEDICAID

## 2021-06-09 PROCEDURE — 97140 MANUAL THERAPY 1/> REGIONS: CPT

## 2021-06-09 PROCEDURE — 97110 THERAPEUTIC EXERCISES: CPT

## 2021-06-09 NOTE — PROGRESS NOTES
Occupational Therapy    OCCUPATIONAL THERAPY PROGRESS NOTE    Date:  2021                         Initial Evaluation Date: 2021    Patient Name:  Ines Padgett    :  2000    Restrictions/Precautions:  No strengthening at this time, moderate  Fall-  [place] - has L LE below knee amputee  Diagnosis:  R thumb CMC thumb contracture release         M24.541                                                Insurance/Certification information:  [address] [facility]  Referring Practitioner:  Dr. Britney Nagel  Date of Surgery/Injury: sx 2021 - injury 2020  Plan of care signed (Y/N): N  Visit# / total visits:     Pain Level: Very low level achiness reported     Subjective: Pt arrives to therapy,\" This top strap came off of my splint again. Its when it gets hot- the glue gets soft and it pulls off.\"     Objective:  Updated POC completed 21. INTERVENTION: COMPLETED: SPECIFICS/COMMENTS:   Modality:               AROM:     Wrist all planes for ^ing mobility 2 x 10 reps Tenodesis for wrist flex/ ext     Thumb IP/ MP/ CMC flex/ext for ^ing mobility  2 sets of 10 each    Digital tendon gliding for ^ing mobility  3 x 10 reps Hook difficult - did with back of hand on table and pt curling in - at home using a pen    Opposition for ^ing thumb mobility/decreasing stiffness 3 x 5 reps Opposition full   Thumb isolated rad and palm abduction for ^ing thumb mobility X  3 sets of 10 each Decreasing stiffness noted        AAROM:     Wrist all planes for ^ing mobility X 10 reps    Thumb IP/ MP/ [facility] flex/ext for ^ing mobility 3 X 10 reps each Focused isolated IP thumb flex & extension with CMC/MP jts pulled into extension stretch. Therapist instructed pt to add to HEP. Pt displays good understanding.    PROM/Stretching:     Wrist all planes for ^ing mobility x Intermittently with prolonged holds   Thumb all planes focus web space for ^ing mobility x Intermittently with prolonged holds focus web space- Therapist instructed pt with passive stretch to thumb by tenting fingertips over tabletop for home as well as prayer stretch for wrist & fingers combined        Scar Mass/Edema Control:     Retrograde massage for decreasing swelling x R wrist/ hand / thumb    Desensitization x Rolling wrist & thumb across ribbed therabar        Strengthening:     Grasp strengthening x ^d res yellow therabar for wringing, up & down bends    strengtheninig 3 x 10 reps Black hand gripper- middle resistance   Other:     Splint  x Fitting well- strap fell off- therapist replaced- pt reports compliance with   HEP  x Reviewed all. Assessment/Comments: Pt is making Good progress toward stated plan of care. Pt arrives to therapy session at 4 weeks post op with his brother reporting no discomfort c/o's but does report that top strap fell off of his splint again. Therapist fixed top strapping with adhesive remover/ followed by super glue on the sticky back hook to ^ holding power. Will monitor to make sure strapping holds. Pt tolerated session well with increased ROM/decreased stiffness of thumb web space noted. No scar sensitivity reported with scar massage. Edema level very minimal. Therapist instructed pt to cont with passive stretch to thumb by tenting fingertips over tabletop for home as well as prayer stretch for wrist & fingers combined. Therapist also instructed pt to cont with isolated IP thumb flex & extension with CMC/MP jts pulled into extension stretch. Pt reports compliance with wearing his splint and taking it off at home for functional activities-video games & eating meals. Will continue to progress as tolerated focus ^ing mobility of thumb for ^ing fxl use of hand.        6/02/21 Reassessment  Right  Upper Extremity  AROM           IE 6/02/21  WRIST Flexion 0-70* 60*  ^65*         Extension 0-80* 57*  ^70*         Radial Deviation 0-20* 24* ^30*          Ulnar Deviation 0-30* 30*  30*        Right  Hand ROM    AROM [x]? PROM[]? R  /  L   R/L       THUMB MP Flexion  14-23* H /0-50* 35*/59* ^38*/ 60*          IP Flexion  23-30* H/ 0-80* 44*/68* ^74* /70*          Radial Abduction 53-71* 25*/63* ^30* /65*          Palmar Abduction 50-71* 44*/ 68* ^47*/ 70*             -Rehab Potential: Good  -Requires OT Follow Up: Yes    Time In:   11:00 am            Time Out:   12:05 pm              Visit #: 8    Units allowed through:  8/11/2021    CODE         Mins          Units Total Used/Total Authorized  [id number] Manual 35 2 13/80   91152 Therapeutic Ex 30 2 10/80   54455 Therapeutic Activity   0/80   50249 Fluidotherapy   0/80   70816 ADL/COMP Tech Train   0/80   73950 Paraffin    0/20          Total           -Response to Treatment: Good. Pt is hoping his thumb will start to straighten up better with the added ex's since it is bending better now      GOALS (Long term same as Short term): 6/02/21 Reassess  1. ) Patient will demonstrate good understanding of home program (exercises/activities/diagnosis/prognosis/goals) with good accuracy. Goal Met & ongoing  2. ) Patient will demonstrate increased active/passive range of motion of their R thumb, wrist to [facility] for ADL/IADL completion. Goal Progressing  3. ) Patient will demonstrate increased /pinch strength of at least 10 / 5 pinch pounds of their Right hand. Goal will be initiated when appropriate  4. ) Patient to report decreased pain in their affected Right  upper extremity from 6.5 /10 to 3/10 or less with resistive functional use. Goal Met & ongoing: Goal updated to 0-1/10 pain level to be achieved 6/02/21  5. ) Increase in fine motor function as evidenced by decreased time to complete 9-hole peg test by at least 5-10 seconds. Goal Progressing  6. ) Patient to report 100% compliance with their splint wear, care, and precautions if needed.  Goal Met & ongoing  7. ) Patient will be knowledgeable of edema control techniques as evident with decreases from mild +  to mild/none. Goal Progressing  8. ) Patient will demonstrate a non-tender/non-adherent scar. Goal Progressing Well  9. ) Patient will report ADL functions as Mod I/I using R UE> . Goal Progressing  10 ) Patient will decrease QuickDASH score to 50% or less for increased participation in daily functional activities. Goal Progressing Well     Plan:   [x]  Continues Plan of care: Treatment covered based on POC and graduated to patient's progress. Pt education continues at each visit to obtain maximum benefits from skilled OT intervention.   []  Alter Plan of care:   []  Discharge:      Elias SCHWARTZ, [id number]

## 2021-06-14 ENCOUNTER — HOSPITAL ENCOUNTER (OUTPATIENT)
Dept: OCCUPATIONAL THERAPY | Age: 21
Setting detail: THERAPIES SERIES
Discharge: HOME | End: 2021-06-14
Payer: MEDICAID

## 2021-06-14 PROCEDURE — 97140 MANUAL THERAPY 1/> REGIONS: CPT

## 2021-06-14 PROCEDURE — 97110 THERAPEUTIC EXERCISES: CPT

## 2021-06-14 NOTE — PROGRESS NOTES
Occupational Therapy    OCCUPATIONAL THERAPY PROGRESS NOTE    Date:  2021                         Initial Evaluation Date: 2021    Patient Name:  Thomas Molina    :  2000    Restrictions/Precautions:  No strengthening at this time, moderate  Fall-  [place] - has L LE below knee amputee  Diagnosis:  R thumb CMC thumb contracture release         M24.541                                                Insurance/Certification information:  [employer]  Referring Practitioner:  Dr. Nay Richardson  Date of Surgery/Injury: sx 2021 - injury 2020  Plan of care signed (Y/N): N  Visit# / total visits:     Pain Level: Very low level achiness reported     Subjective: Pt arrives to therapy,\" I like this putty. It kind of feels good pushing against the bottom of my thumb. \"     Objective:  Updated POC completed 21. INTERVENTION: COMPLETED: SPECIFICS/COMMENTS:   Modality:               AROM:     Wrist all planes for ^ing mobility 2 x 10 reps Tenodesis for wrist flex/ ext     Thumb IP/ MP/ CMC flex/ext for ^ing mobility  2 sets of 10 each    Digital tendon gliding for ^ing mobility  3 x 10 reps Hook difficult - did with back of hand on table and pt curling in - at home using a pen    Opposition for ^ing thumb mobility/decreasing stiffness 3 x 5 reps Opposition full   Thumb isolated rad and palm abduction for ^ing thumb mobility X  3 sets of 10 each Decreasing stiffness noted        AAROM:     Wrist all planes for ^ing mobility X 10 reps    Thumb IP/ MP/ [facility] flex/ext for ^ing mobility 3 X 10 reps each Focused isolated IP thumb flex & extension with CMC/MP jts pulled into extension stretch. Therapist instructed pt to add to HEP. Pt displays good understanding.    PROM/Stretching:     Wrist all planes for ^ing mobility x Intermittently with prolonged holds   Thumb all planes focus web space for ^ing mobility x Intermittently with prolonged holds focus web space- Therapist instructed pt with passive stretch to thumb by tenting fingertips over tabletop for home as well as prayer stretch for wrist & fingers combined   Composite wrist & digits stretch x Power Web stretches xx 15 reps for 5 second holds   Scar Mass/Edema Control:     Retrograde massage for decreasing swelling x R wrist/ hand / thumb    Desensitization x Rolling wrist & thumb across ribbed therabar & theraputty (instructed for home)        Strengthening:     Grasp strengthening x * Mixed theraputty for gripping x 5 mins- Issued & instructed pt with theraputty for home today  * Yellow therabar for wringing, up & down bends  * Red Power Web for grasping & grasping with pulls   Pinch strengthening X 15 reps each * Power Web lat pinch pulls, tip pulls    strengthening 3 x 10 reps Black hand gripper- middle resistance   Other:     Splint  x pt reports compliance with   HEP  x Reviewed all. Assessment/Comments: Pt is making Good progress toward stated plan of care. Pt arrives to therapy session in good spirits with no discomfort c/o's R hand/thumb. Pt tolerated session well with increased ROM/decreased stiffness of thumb web space noted. No scar sensitivity reported with scar massage. Edema level very minimal.  Therapist added soft mixed theraputty for gripping  & rolling over thumb pad & wrist scar areas x 5 mins each. Pt tolerated very well. Therapist issued & instructed pt with theraputty for HEP today. Therapist instructed pt to cont HEP of passive stretch to thumb by tenting fingertips over tabletop for home as well as prayer stretch for wrist & fingers combined. Therapist also instructed pt to cont with isolated IP thumb flex & extension with CMC/MP jts pulled into extension stretch. Pt reports compliance with wearing his splint and taking it off at home for functional activities- showering, video games & eating meals, cont. ...  Will continue to progress as tolerated focus ^ing mobility of thumb for [employer] fxl use of hand. 6/02/21 Reassessment  Right  Upper Extremity  AROM           IE 6/02/21  WRIST Flexion 0-70* 60*  ^65*         Extension 0-80* 57*  ^70*         Radial Deviation 0-20* 24* ^30*          Ulnar Deviation 0-30* 30*  30*          Right  Hand ROM    AROM [x]? PROM[]? R  /  L   R/L       THUMB MP Flexion  14-23* H /0-50* 35*/59* ^38*/ 60*          IP Flexion  23-30* H/ 0-80* 44*/68* ^21* /70*          Radial Abduction 53-71* 25*/63* ^30* /65*          Palmar Abduction 50-71* 44*/ 68* ^47*/ 70*             -Rehab Potential: Good  -Requires OT Follow Up: Yes    Time In:   4:00 pm            Time Out:   5:05 pm              Visit #: 9    Units allowed through:  8/11/2021    CODE         Mins          Units Total Used/Total Authorized  [id number] Manual 35 2 15/80   52627 Therapeutic Ex 30 2 12/80   37495 Therapeutic Activity   0/80   17621 Fluidotherapy   0/80   41072 ADL/COMP Tech Train   0/80   09834 Paraffin    0/20          Total           -Response to Treatment: Good. Pt likes added theraputty ex's today and is happy to have some for home use      GOALS (Long term same as Short term): 6/02/21 Reassess  1. ) Patient will demonstrate good understanding of home program (exercises/activities/diagnosis/prognosis/goals) with good accuracy. Goal Met & ongoing  2. ) Patient will demonstrate increased active/passive range of motion of their R thumb, wrist to [facility] for ADL/IADL completion. Goal Progressing  3. ) Patient will demonstrate increased /pinch strength of at least 10 / 5 pinch pounds of their Right hand. Goal will be initiated when appropriate  4. ) Patient to report decreased pain in their affected Right  upper extremity from 6.5 /10 to 3/10 or less with resistive functional use. Goal Met & ongoing: Goal updated to 0-1/10 pain level to be achieved 6/02/21  5. ) Increase in fine motor function as evidenced by decreased time to complete 9-hole peg test by at least 5-10 seconds.  Goal Progressing  6. ) Patient to report 100% compliance with their splint wear, care, and precautions if needed. Goal Met & ongoing  7. ) Patient will be knowledgeable of edema control techniques as evident with decreases from mild +  to mild/none. Goal Progressing  8. ) Patient will demonstrate a non-tender/non-adherent scar. Goal Progressing Well  9. ) Patient will report ADL functions as Mod I/I using R UE> . Goal Progressing  10 ) Patient will decrease QuickDASH score to 50% or less for increased participation in daily functional activities. Goal Progressing Well     Plan:   [x]  Continues Plan of care: Treatment covered based on POC and graduated to patient's progress. Pt education continues at each visit to obtain maximum benefits from skilled OT intervention.   []  Alter Plan of care:   []  Discharge:      Sherlyn WHITE/KAVITHA, [id number]

## 2021-06-16 ENCOUNTER — HOSPITAL ENCOUNTER (OUTPATIENT)
Dept: OCCUPATIONAL THERAPY | Age: 21
Setting detail: THERAPIES SERIES
Discharge: HOME | End: 2021-06-16
Payer: MEDICAID

## 2021-06-16 PROCEDURE — 97140 MANUAL THERAPY 1/> REGIONS: CPT

## 2021-06-16 PROCEDURE — 97110 THERAPEUTIC EXERCISES: CPT

## 2021-06-16 NOTE — PROGRESS NOTES
Occupational Therapy    OCCUPATIONAL THERAPY PROGRESS NOTE    Date:  2021                         Initial Evaluation Date: 2021    Patient Name:  Pato Singh    :  2000    Restrictions/Precautions:  No strengthening at this time, moderate  Fall-  [place] - has L LE below knee amputee  Diagnosis:  R thumb CMC thumb contracture release         M24.541                                                Insurance/Certification information:  [employer]  Referring Practitioner:  Dr. Charles Joiner  Date of Surgery/Injury: sx 2021 - injury 2020  Plan of care signed (Y/N): N  Visit# / total visits: 10/ 20    Pain Level: Very low level achiness reported     Subjective: Pt arrives to therapy with his new prosthetic leg donned and using his crutches with both arms/hands. Pt has R splint donned. Objective:  Updated POC completed 21. INTERVENTION: COMPLETED: SPECIFICS/COMMENTS:   Modality:               AROM:     Wrist all planes for ^ing mobility 2 x 10 reps Tenodesis for wrist flex/ ext     Thumb IP/ MP/ CMC flex/ext for ^ing mobility  2 sets of 10 each    Digital tendon gliding for ^ing mobility  3 x 10 reps Hook difficult - did with back of hand on table and pt curling in - at home using a pen    Opposition for ^ing thumb mobility/decreasing stiffness 3 x 5 reps Opposition full   Thumb isolated rad and palm abduction for ^ing thumb mobility X  3 sets of 10 each Decreasing stiffness noted        AAROM:     Wrist all planes for ^ing mobility X 10 reps    Thumb IP/ MP/ [employer] flex/ext for ^ing mobility 3 X 10 reps each Focused isolated IP thumb flex & extension with CMC/MP jts pulled into extension stretch. Therapist instructed pt to add to HEP. Pt displays good understanding.    PROM/Stretching:     Wrist all planes for ^ing mobility x Intermittently with prolonged holds   Thumb all planes focus web space for ^ing mobility x Intermittently with prolonged holds focus web space- Therapist instructed pt with passive stretch to thumb by tenting fingertips over tabletop for home as well as prayer stretch for wrist & fingers combined   Composite wrist & digits stretch x Power Web stretches xx 15 reps for 5 second holds   Scar Mass/Edema Control:     Retrograde massage for decreasing swelling x R wrist/ hand / thumb    Desensitization x Rolling wrist & thumb across ribbed therabar & theraputty (instructed for home)        Strengthening:     Grasp strengthening x * Mixed theraputty for gripping x 5 mins- Pt reports compliance with theraputty for home use  * Yellow therabar for wringing, up & down bends  * Red Power Web for grasping & grasping with pulls   Pinch strengthening X 15 reps each * Power Web lat pinch pulls, tip pulls    strengthening 3 x 10 reps 3# blue hand ball for uni & poonam hand tosses, gripping & picking up & placing in various planes with & without forearm twists    Other:     Splint  x pt reports compliance with   HEP  x Reviewed all. Assessment/Comments: Pt is making Good progress toward stated plan of care. Pt arrives to therapy session in good spirits with no discomfort c/o's when using R hand/thumb on crutches with his new prosthetic leg. Pt reports compliance with putty use at home. Pt tolerates todays session well with added 3# hand ball ex's for opening grasp & web space of thumb while fxly strengthening arm. Therapist instructed pt to cont HEP of passive stretch to thumb by tenting fingertips over tabletop for home as well as prayer stretch for wrist & fingers combined. Therapist also instructed pt to cont with isolated IP thumb flex & extension with CMC/MP jts pulled into extension stretch. Pt reports compliance with wearing his splint and taking it off at home for functional activities- showering, video games & eating meals, cont. ... Will monitor splint use with crutches.  Will continue to progress as tolerated focus ^ing mobility of thumb for Goal Progressing  6. ) Patient to report 100% compliance with their splint wear, care, and precautions if needed. Goal Met & ongoing  7. ) Patient will be knowledgeable of edema control techniques as evident with decreases from mild +  to mild/none. Goal Progressing  8. ) Patient will demonstrate a non-tender/non-adherent scar. Goal Progressing Well  9. ) Patient will report ADL functions as Mod I/I using R UE> . Goal Progressing  10 ) Patient will decrease QuickDASH score to 50% or less for increased participation in daily functional activities. Goal Progressing Well     Plan:   [x]  Continues Plan of care: Treatment covered based on POC and graduated to patient's progress. Pt education continues at each visit to obtain maximum benefits from skilled OT intervention.   []  Alter Plan of care:   []  Discharge:      Rd WHITE/KAVITHA, [id number]

## 2021-06-21 ENCOUNTER — HOSPITAL ENCOUNTER (OUTPATIENT)
Dept: OCCUPATIONAL THERAPY | Age: 21
Setting detail: THERAPIES SERIES
Discharge: HOME | End: 2021-06-21
Payer: MEDICAID

## 2021-06-21 ENCOUNTER — TELEPHONE (OUTPATIENT)
Dept: ORTHOPEDIC SURGERY | Age: 21
End: 2021-06-21

## 2021-06-21 DIAGNOSIS — S88.912D TRAUMATIC AMPUTATION OF LEFT LOWER EXTREMITY, SUBSEQUENT ENCOUNTER (HCC): Primary | ICD-10-CM

## 2021-06-21 PROCEDURE — 97140 MANUAL THERAPY 1/> REGIONS: CPT

## 2021-06-21 PROCEDURE — 97110 THERAPEUTIC EXERCISES: CPT

## 2021-06-21 NOTE — PROGRESS NOTES
Occupational Therapy    OCCUPATIONAL THERAPY PROGRESS NOTE    Date:  2021                         Initial Evaluation Date: 2021    Patient Name:  Staci Hernandez    :  2000    Restrictions/Precautions:  No strengthening at this time, moderate  Fall-  [place] - has L LE below knee amputee  Diagnosis:  R thumb CMC thumb contracture release         M24.541                                                Insurance/Certification information:  [employer]  Referring Practitioner:  Dr. Rhiannon Flowers  Date of Surgery/Injury: sx 2021 - injury 2020  Plan of care signed (Y/N): N  Visit# / total visits:     Pain Level: Very low level achiness reported     Subjective: Pt states,\" My thumb is sore to stretch open big. \"     Objective:  Updated POC completed 21. INTERVENTION: COMPLETED: SPECIFICS/COMMENTS:   Modality:               AROM:     Wrist all planes for ^ing mobility 2 x 10 reps Tenodesis for wrist flex/ ext     Thumb IP/ MP/ CMC flex/ext for ^ing mobility  2 sets of 10 each    Digital tendon gliding for ^ing mobility  3 x 10 reps Hook difficult - did with back of hand on table and pt curling in - at home using a pen    Opposition for ^ing thumb mobility/decreasing stiffness 3 x 5 reps Opposition full   Thumb isolated rad and palm abduction for ^ing thumb mobility X  3 sets of 10 each Decreasing stiffness noted        AAROM:     Wrist all planes for ^ing mobility X 10 reps    Thumb IP/ MP/ [facility] flex/ext for ^ing mobility 3 X 10 reps each Focused isolated IP thumb flex & extension with CMC/MP jts pulled into extension stretch. Therapist instructed pt to add to HEP. Pt displays good understanding.    PROM/Stretching:     Wrist all planes for ^ing mobility x Intermittently with prolonged holds   Thumb all planes focus web space for ^ing mobility x Intermittently with prolonged holds focus web space- Therapist instructed pt with passive stretch to thumb by tenting fingertips over tabletop for home as well as prayer stretch for wrist & fingers combined   Composite wrist & digits stretch x Power Web stretches x 15 reps for 5 second holds   Scar Mass/Edema Control:     Retrograde massage for decreasing swelling x R wrist/ hand / thumb    Desensitization x Rolling wrist & thumb across ribbed therabar & theraputty (instructed for home)        Strengthening:     Grasp strengthening x * Mixed theraputty for gripping x 5 mins- Pt reports compliance with theraputty for home use  * Yellow therabar for wringing, up & down bends  * Red Power Web for grasping & grasping with pulls   Wrist flex/ext strengthening 2 x 10 reps * Hands on resistance w/ therapist   Pinch strengthening X 15 reps each * Power Web lat pinch pulls, tip pulls    strengthening 3 x 10 reps *Blue ball for uni & poonam hand tosses, gripping & picking up & placing in various planes with & without forearm twists    Other:     Splint  x pt reports compliance with   HEP  x Reviewed all. Assessment/Comments: Pt is making Good progress toward stated plan of care. Pt arrives to therapy session in good spirits reporting he is stretching his thumb out a lot and it is a little sore. Pt tolerated light fxl resistive ex's with decreasing c/o's of discomfort. Pts fxl  strength is improving and therapist assessed strength of  this date: Right  100# (Left  125#). Pt reports no discomfort when squeezing dynamometer. Pt reports compliance with stretches & putty use at home. Will continue to progress as tolerated focus ^ing mobility of thumb for ^ing fxl use of hand. 6/02/21 Reassessment  Right  Upper Extremity  AROM           IE 6/02/21  WRIST Flexion 0-70* 60*  ^65*         Extension 0-80* 57*  ^70*         Radial Deviation 0-20* 24* ^30*          Ulnar Deviation 0-30* 30*  30*          Right  Hand ROM    AROM [x]?          PROM[]? R  /  L   R/L       THUMB MP Flexion  14-23* H /0-50* 35*/59* ^38*/ 60*          IP Flexion  23-30* H/ 0-80* 44*/68* ^60* /70*          Radial Abduction 53-71* 25*/63* ^30* /65*          Palmar Abduction 50-71* 44*/ 68* ^47*/ 70*           Dynamometer (setting 2):     Left: 125#      Right: 100# (affected)         -Rehab Potential: Good  -Requires OT Follow Up: Yes    Time In:   4:00 pm            Time Out:   5:00 pm              Visit #: 11    Units allowed through:  8/11/2021    CODE         Mins          Units Total Used/Total Authorized  [id number] Manual 30 2 19/80   84928 Therapeutic Ex 30 2 16/80   20999 Therapeutic Activity   0/80   24173 Fluidotherapy   0/80   88440 ADL/COMP Tech Train   0/80   12539 Paraffin    0/20          Total           -Response to Treatment: Good. GOALS (Long term same as Short term): 6/02/21 Reassess  1. ) Patient will demonstrate good understanding of home program (exercises/activities/diagnosis/prognosis/goals) with good accuracy. Goal Met & ongoing  2. ) Patient will demonstrate increased active/passive range of motion of their R thumb, wrist to [facility] for ADL/IADL completion. Goal Progressing  3. ) Patient will demonstrate increased /pinch strength of at least 10 / 5 pinch pounds of their Right hand. Goal will be initiated when appropriate  4. ) Patient to report decreased pain in their affected Right  upper extremity from 6.5 /10 to 3/10 or less with resistive functional use. Goal Met & ongoing: Goal updated to 0-1/10 pain level to be achieved 6/02/21  5. ) Increase in fine motor function as evidenced by decreased time to complete 9-hole peg test by at least 5-10 seconds. Goal Progressing  6. ) Patient to report 100% compliance with their splint wear, care, and precautions if needed. Goal Met & ongoing  7. ) Patient will be knowledgeable of edema control techniques as evident with decreases from mild +  to mild/none. Goal Progressing  8. ) Patient will demonstrate a non-tender/non-adherent scar.  Goal Progressing Well  9. ) Patient will

## 2021-06-21 NOTE — TELEPHONE ENCOUNTER
Orders signed. Please see attached. Thank you.   Electronically signed by Logan Costa PA-C on 6/21/2021 at 1:32 PM

## 2021-06-21 NOTE — TELEPHONE ENCOUNTER
Skip Leaks, contacted office requesting new PT order for patient. Order pended and routed for decision and signature.     Last OV: 01/27/21, PRNed    Future Appointments   Date Time Provider Len Joanie   6/21/2021  4:00 PM DIANE Ray OT [facility]   6/23/2021 11:00 AM DIANE Ray OT [facility]   6/29/2021  1:50 PM Trino Mc MD [facility]

## 2021-06-22 ENCOUNTER — TELEPHONE (OUTPATIENT)
Dept: ORTHOPEDIC SURGERY | Age: 21
End: 2021-06-22

## 2021-06-22 ENCOUNTER — HOSPITAL ENCOUNTER (OUTPATIENT)
Dept: PHYSICAL THERAPY | Age: 21
Setting detail: THERAPIES SERIES
End: 2021-06-22
Payer: MEDICAID

## 2021-06-22 NOTE — TELEPHONE ENCOUNTER
Traumatic amputation of left lower extremity, subsequent encounter [facility])    Patient scheduled with Dr Cooper Arriaga 7/7/21 for Prosthesis check. Patient states just got his Prosthesis on Wednesday and was instructed to make follow up appointment after he receives prosthesis. Please call patient at [phone] if appointment is able to be moved up.

## 2021-06-22 NOTE — TELEPHONE ENCOUNTER
Date of Procedure: 12/10/2020  Procedure(s):  REMOVAL ANTERIOR PELVIS EXTERNAL FIXATOR  Surgeon(s):  Tashi Dutta MD      Future Appointments   Date Time Provider Len Nair   6/23/2021 11:00 AM DIANE Rose [facility]   6/29/2021  1:50 PM Denilson Collins MD [facility]   7/7/2021  7:45 AM Tashi Dutta MD [facility]

## 2021-06-23 ENCOUNTER — HOSPITAL ENCOUNTER (OUTPATIENT)
Dept: OCCUPATIONAL THERAPY | Age: 21
Setting detail: THERAPIES SERIES
Discharge: HOME | End: 2021-06-23
Payer: MEDICAID

## 2021-06-23 PROCEDURE — 97140 MANUAL THERAPY 1/> REGIONS: CPT

## 2021-06-23 PROCEDURE — 97110 THERAPEUTIC EXERCISES: CPT

## 2021-06-23 NOTE — PROGRESS NOTES
Occupational Therapy    OCCUPATIONAL THERAPY PROGRESS NOTE    Date:  2021                         Initial Evaluation Date: 2021    Patient Name:  James White    :  2000    Restrictions/Precautions:  No strengthening at this time, moderate  Fall-  [place] - has L LE below knee amputee  Diagnosis:  R thumb CMC thumb contracture release         M24.541                                                Insurance/Certification information:  [employer]  Referring Practitioner:  Dr. Uma Angeles  Date of Surgery/Injury: sx 2021 - injury 2020  Plan of care signed (Y/N): N  Visit# / total visits:     Pain Level: Very low level achiness reported     Subjective: Pt states,\" I'm using the brace when I walk a lot on my crutches but I can use the crutches without the brace too. \"     Objective:  Updated POC completed 21. INTERVENTION: COMPLETED: SPECIFICS/COMMENTS:   Modality:               AROM:     Wrist all planes for ^ing mobility 2 x 10 reps Tenodesis for wrist flex/ ext     Thumb IP/ MP/ CMC flex/ext for ^ing mobility  2 sets of 10 each    Digital tendon gliding for ^ing mobility  3 x 10 reps Hook difficult - did with back of hand on table and pt curling in - at home using a pen    Opposition for ^ing thumb mobility/decreasing stiffness 3 x 5 reps Opposition full   Thumb isolated rad and palm abduction for ^ing thumb mobility X  3 sets of 10 each Decreasing stiffness noted        AAROM:     Wrist all planes for ^ing mobility X 10 reps    Thumb IP/ MP/ [facility] flex/ext for ^ing mobility 3 X 10 reps each Focused isolated IP thumb flex & extension with CMC/MP jts pulled into extension stretch. Therapist instructed pt to add to HEP. Pt displays good understanding.    PROM/Stretching:     Wrist all planes for ^ing mobility x Intermittently with prolonged holds   Thumb all planes focus web space for ^ing mobility x Intermittently with prolonged holds focus web space- Therapist instructed pt with passive stretch to thumb by tenting fingertips over tabletop for home as well as prayer stretch for wrist & fingers combined   Composite wrist & digits stretch x Power Web stretches x 15 reps for 5 second holds   Scar Mass/Edema Control:     Retrograde massage for decreasing swelling x R wrist/ hand / thumb    Desensitization x Rolling wrist & thumb across ribbed therabar & theraputty (instructed for home)        Strengthening:     Grasp strengthening x * Mixed theraputty for gripping x 5 mins- Pt reports compliance with theraputty for home use  * Yellow therabar for wringing, up & down bends  * Red Power Web for grasping & grasping with pulls   Wrist flex/ext strengthening 2 x 10 reps * Hands on resistance w/ therapist   Pinch strengthening X 15 reps each * Power Web lat pinch pulls, tip pulls    strengthening 3 x 10 reps *Blue ball for uni & poonam hand tosses, gripping & picking up & placing in various planes with & without forearm twists    Other:     Splint  x pt reports compliance with   HEP  x Reviewed all. Assessment/Comments: Pt is making Good progress toward stated plan of care. Pt arrives to therapy session in good spirits reporting he is using the brace when he walks with his crutches but can use the crutches without the brace too. Therapist gently Senait Mancera aggressiveness of thumb stretches. Pt tolerated ^ in aggressiveness and other light fxl resistive ex's well. Therapist reassessed pts  strength with improvement noted below. Pt is to see Dr. Yanet Casiano next week. Therapist will complete a reassessment on Monday- next visit. Pt reports compliance with stretches & putty use at home. Will continue to progress as tolerated focus ^ing mobility of thumb for ^ing fxl use of hand.        6/02/21 Reassessment  Right  Upper Extremity  AROM           IE 6/02/21  WRIST Flexion 0-70* 60*  ^65*         Extension 0-80* 57*  ^70*         Radial Deviation 0-20* 24* ^30*          with decreases from mild +  to mild/none. Goal Progressing  8. ) Patient will demonstrate a non-tender/non-adherent scar. Goal Progressing Well  9. ) Patient will report ADL functions as Mod I/I using R UE> . Goal Progressing  10 ) Patient will decrease QuickDASH score to 50% or less for increased participation in daily functional activities. Goal Progressing Well     Plan:   [x]  Continues Plan of care: Treatment covered based on POC and graduated to patient's progress. Pt education continues at each visit to obtain maximum benefits from skilled OT intervention.   []  Alter Plan of care:   []  Discharge:      Santi SCHWARTZ, [id number]

## 2021-06-28 ENCOUNTER — HOSPITAL ENCOUNTER (OUTPATIENT)
Dept: OCCUPATIONAL THERAPY | Age: 21
Setting detail: THERAPIES SERIES
Discharge: HOME | End: 2021-06-28
Payer: MEDICAID

## 2021-06-28 PROCEDURE — 97140 MANUAL THERAPY 1/> REGIONS: CPT

## 2021-06-28 PROCEDURE — 97110 THERAPEUTIC EXERCISES: CPT

## 2021-06-28 NOTE — PROGRESS NOTES
Occupational Therapy    OCCUPATIONAL THERAPY PROGRESS/  REASSESSMENT NOTE    Date:  2021                         Initial Evaluation Date: 2021    Patient Name:  Irma Kauffman    :  2000    Restrictions/Precautions:  No strengthening at this time, moderate  Fall-  pt came in in w[place] - has L LE below knee amputee  Diagnosis:  R thumb CMC thumb contracture release         M24.541                                                Insurance/Certification information:  [employer]  Referring Practitioner:  Dr. Sam Sommers  Date of Surgery/Injury: sx 2021 - injury 2020  Plan of care signed (Y/N): N  Visit# / total visits:     Pain Level: Very low level achiness reported     Subjective: Pt states,\" I'm using the brace when I walk a lot on my crutches but I can use the crutches without the brace too. I have the brace off most of the time now. \"     Objective:  Updated POC completed today 21. INTERVENTION: COMPLETED: SPECIFICS/COMMENTS:   Modality:               AROM:     Wrist all planes for ^ing mobility 2 x 10 reps Tenodesis for wrist flex/ ext     Thumb IP/ MP/ CMC flex/ext for ^ing mobility  2 sets of 10 each    Digital tendon gliding for ^ing mobility  3 x 10 reps Hook difficult - did with back of hand on table and pt curling in - at home using a pen    Opposition for ^ing thumb mobility/decreasing stiffness 3 x 5 reps Opposition full   Thumb isolated rad and palm abduction for ^ing thumb mobility X  3 sets of 10 each Decreasing stiffness noted        AAROM:     Wrist all planes for ^ing mobility X 10 reps    Thumb IP/ MP/ [facility] flex/ext for ^ing mobility 3 X 10 reps each Focused isolated IP thumb flex & extension with CMC/MP jts pulled into extension stretch. Therapist instructed pt to add to HEP. Pt displays good understanding.    PROM/Stretching:     Wrist all planes for ^ing mobility x Intermittently with prolonged holds   Thumb all planes focus web space for ^ing mobility x Intermittently with prolonged holds focus web space- Therapist instructed pt with passive stretch to thumb by tenting fingertips over tabletop for home as well as prayer stretch for wrist & fingers combined   Composite wrist & digits stretch x Power Web stretches x 15 reps for 5 second holds   Scar Mass/Edema Control:     Retrograde massage for decreasing swelling x R wrist/ hand / thumb    Desensitization x Rolling wrist & thumb across ribbed therabar & theraputty (instructed for home)        Strengthening:     Grasp strengthening x * Mixed theraputty for gripping x 5 mins- Pt reports compliance with theraputty for home use  * Yellow therabar for wringing, up & down bends  * Red Power Web for grasping & grasping with pulls   Wrist flex/ext strengthening 2 x 10 reps * Hands on resistance w/ therapist   Pinch strengthening X 15 reps each * Power Web lat pinch pulls, tip pulls    strengthening 3 x 10 reps *Blue ball for uni & poonam hand tosses, gripping & picking up & placing in various planes with & without forearm twists    Other:     Splint  x pt reports compliance with   HEP  x Reviewed all. Assessment/Comments: Pt is making Good progress toward stated plan of care. Pt arrives to therapy session in good spirits reporting he sees Dr. Calvin Marino tomorrow for a follow up appt. Pt tolerates ^ in aggressiveness of thumb stretches and other light fxl resistive ex's well today. Therapist completed a Reassessment today with noted improvements in pts function, mobility & strength. Refer to below for results. Pt reports compliance with stretches & putty use at home.   Will continue to progress per Dr. Calvin Marino orders per tomorrows appt and as tolerated focus ^ing mobility of thumb for ^ing fxl use of hand      6/28/21 Reassessment  Right  Upper Extremity  AROM           IE 6/02/21 6/28/21  WRIST Flexion 0-70* 60*  ^65*  ^70*       Extension 0-80* 57*  ^70*  70*       Radial Deviation 0-20* 24* Kylee Jo  30*       Ulnar Deviation 0-30* 30*  30* ^35*         Right  Hand ROM    AROM [x]? PROM[]? R  /  L   R/L       THUMB MP Flexion  14-23* H /0-50* 35*/59* ^38*/ 60*  ^40*        IP Flexion  23-30* H/ 0-80* 44*/68* ^08* /70*   45*       Radial Abduction 53-71* 25*/63* ^30* /65*   ^40*       Palmar Abduction 50-71* 44*/ 68* ^47*/ 70*   ^50*            * PROM palmar abduction 46* @ IE followed by improvement to 65* passively 6/28/21    Dynamometer (setting 2): 6/28/21     Left: 125#      Right: 115# ^ing (affected)   Pinch Meter:              Lateral: Left= 17# , Right= 10# (affected)              Palmar 3 point: Left= 15# , Right=  8# (affected)     9 Hole Peg Test: 6/28/21              Left:   :28.70              Right:   :39.25 @ IE followed by todays improved completion time in decreased 28 seconds     QuickDASH Score: 77 % disability at IE improved to 22% disabled on 6/28/21       -Rehab Potential: Good  -Requires OT Follow Up: Yes    Time In:   4:00 pm            Time Out:   5:00 pm              Visit #: 13    Units allowed through:  8/11/2021    CODE         Mins          Units Total Used/Total Authorized  42293 Manual 30 2 23/80   20423 Therapeutic Ex 30 2 20/80   07224 Therapeutic Activity   0/80   84056 Fluidotherapy   0/80   54047 ADL/COMP Tech Train   0/80   39176 Paraffin    0/20          Total           -Response to Treatment: Good. Pt is happy to be seeing Dr. Dav Hayes (Long term same as Short term): 6/28/21 Reassess  1. ) Patient will demonstrate good understanding of home program (exercises/activities/diagnosis/prognosis/goals) with good accuracy. Goal Met & ongoing  2. ) Patient will demonstrate increased active/passive range of motion of their R thumb, wrist to [facility] for ADL/IADL completion. Goal Progressing Well  3. ) Patient will demonstrate increased /pinch strength of at least 10 / 5 pinch pounds of their Right hand.  Goal Met  4. ) Patient to report decreased pain in their affected Right  upper extremity from 6.5 /10 to 3/10 or less with resistive functional use. Goal Met: Goal updated to 0-1/10 pain level to be achieved 6/02/21- pt goal progressing well reports 2-3/10 pain level  5. ) Increase in fine motor function as evidenced by decreased time to complete 9-hole peg test by at least 5-10 seconds. Goal Met  6. ) Patient to report 100% compliance with their splint wear, care, and precautions if needed. Goal Met   7. ) Patient will be knowledgeable of edema control techniques as evident with decreases from mild +  to mild/none. Goal Progressing Well  8. ) Patient will demonstrate a non-tender/non-adherent scar. Goal Progressing Very Well  9. ) Patient will report ADL functions as Mod I/I using R UE>. Goal Progressing Very Well  10 ) Patient will decrease QuickDASH score to 50% or less for increased participation in daily functional activities. Goal Met improved from 77% at IE to 22% disabled on 6/28/21     Plan:   [x]  Continues Plan of care: Treatment covered based on POC and graduated to patient's progress. Pt education continues at each visit to obtain maximum benefits from skilled OT intervention.   [x]  Alter Plan of care: Per Dr. Sigifredo Mckay orders at [address] appt  []  Discharge:      João Deluca 46, [phone]

## 2021-06-29 ENCOUNTER — OFFICE VISIT (OUTPATIENT)
Dept: ORTHOPEDIC SURGERY | Age: 21
End: 2021-06-29

## 2021-06-29 VITALS — RESPIRATION RATE: 20 BRPM | BODY MASS INDEX: 28.44 KG/M2 | HEIGHT: 72 IN | WEIGHT: 210 LBS

## 2021-06-29 DIAGNOSIS — M24.541 CONTRACTURE OF RIGHT THUMB JOINT: Primary | ICD-10-CM

## 2021-06-29 PROCEDURE — 99024 POSTOP FOLLOW-UP VISIT: CPT | Performed by: ORTHOPAEDIC SURGERY

## 2021-06-29 NOTE — PROGRESS NOTES
Almost 7 weeks postop right carpal tunnel release and right thumb CMC joint release. Overall he reports his hand is feeling better. He is in therapy. He states he is happy with his progress in therapy. Physical exam: Incisions healing very nicely. No signs of infection. Full digital flexion extension. Sensation intact thumb index middle ring and small fingers. He does have improved thumb radial ulnar deviation however no extension at the Aia 16 joint. Thumb opposition to the small finger. Assessment: Postop carpal tunnel release and thumb CMC joint release    Plan    Okay to advance activities as tolerated. No restrictions. Follow up PRN. I have seen and evaluated the patient and agree with the above assessment and plan on today's visit. I have performed the key components of the history and physical examination with significant findings of postop. I concur with the findings and plan as documented.     Ministerio Miguel MD  6/29/2021

## 2021-07-02 ENCOUNTER — TELEPHONE (OUTPATIENT)
Dept: ORTHOPEDIC SURGERY | Age: 21
End: 2021-07-02

## 2021-07-02 DIAGNOSIS — S32.9XXD: ICD-10-CM

## 2021-07-02 DIAGNOSIS — S88.912D TRAUMATIC AMPUTATION OF LEFT LOWER EXTREMITY, SUBSEQUENT ENCOUNTER (HCC): Primary | ICD-10-CM

## 2021-07-03 NOTE — PROGRESS NOTES
Both wound vacs returned to [facility] on the dirty side.   #[id number]  #[phone]  Returned for room 3811 by [facility]

## 2021-07-07 ENCOUNTER — OFFICE VISIT (OUTPATIENT)
Dept: ORTHOPEDIC SURGERY | Age: 21
End: 2021-07-07
Payer: MEDICAID

## 2021-07-07 ENCOUNTER — HOSPITAL ENCOUNTER (OUTPATIENT)
Dept: OCCUPATIONAL THERAPY | Age: 21
Setting detail: THERAPIES SERIES
Discharge: HOME | End: 2021-07-07
Payer: MEDICAID

## 2021-07-07 ENCOUNTER — HOSPITAL ENCOUNTER (OUTPATIENT)
Dept: GENERAL RADIOLOGY | Age: 21
Discharge: HOME | End: 2021-07-09
Payer: MEDICAID

## 2021-07-07 VITALS — TEMPERATURE: 98.5 F

## 2021-07-07 DIAGNOSIS — S88.912D TRAUMATIC AMPUTATION OF LEFT LOWER EXTREMITY, SUBSEQUENT ENCOUNTER (HCC): ICD-10-CM

## 2021-07-07 DIAGNOSIS — S32.9XXD: ICD-10-CM

## 2021-07-07 DIAGNOSIS — S88.912D TRAUMATIC AMPUTATION OF LEFT LOWER EXTREMITY, SUBSEQUENT ENCOUNTER (HCC): Primary | ICD-10-CM

## 2021-07-07 PROCEDURE — 72190 X-RAY EXAM OF PELVIS: CPT

## 2021-07-07 PROCEDURE — G8417 CALC BMI ABV UP PARAM F/U: HCPCS | Performed by: PHYSICIAN ASSISTANT

## 2021-07-07 PROCEDURE — 97140 MANUAL THERAPY 1/> REGIONS: CPT

## 2021-07-07 PROCEDURE — 97110 THERAPEUTIC EXERCISES: CPT

## 2021-07-07 PROCEDURE — 99212 OFFICE O/P EST SF 10 MIN: CPT | Performed by: PHYSICIAN ASSISTANT

## 2021-07-07 PROCEDURE — 1036F TOBACCO NON-USER: CPT | Performed by: PHYSICIAN ASSISTANT

## 2021-07-07 PROCEDURE — G8427 DOCREV CUR MEDS BY ELIG CLIN: HCPCS | Performed by: PHYSICIAN ASSISTANT

## 2021-07-07 PROCEDURE — 73552 X-RAY EXAM OF FEMUR 2/>: CPT

## 2021-07-07 NOTE — PROGRESS NOTES
Occupational Therapy    OCCUPATIONAL THERAPY PROGRESS NOTE    Date:  2021                         Initial Evaluation Date: 2021    Patient Name:  Fili Lee    :  2000    Restrictions/Precautions:  No strengthening at this time, moderate  Fall-  pt came in in [place] - has L LE below knee amputee  Diagnosis:  R thumb CMC thumb contracture release         M24.541                                                Insurance/Certification information:  [address]  Referring Practitioner:  Dr. Nestor King  Date of Surgery/Injury: sx 2021 - injury 2020  Plan of care signed (Y/N): N  Visit# / total visits:     Pain Level: Very low level achiness reported     Subjective: Pt states,\" I saw Dr. Angie Luong and he was happy with my progress. He didn't say anything about another surgery. I am to keep coming to therapy. \"     Objective:  Updated POC completed 21. INTERVENTION: COMPLETED: SPECIFICS/COMMENTS:   Modality:               AROM:                 Digital tendon gliding for ^ing mobility  3 x 10 reps Hook difficult - did with back of hand on table and pt curling in - at home using a pen    Opposition for ^ing thumb mobility/decreasing stiffness 3 x 5 reps Opposition full   Thumb isolated rad and palm abduction for ^ing thumb mobility X  3 sets of 10 each Decreasing stiffness noted        AAROM:     Wrist all planes for ^ing mobility 2 X 10 reps Tenodesis   Thumb IP/ MP/ CMC flex/ext for ^ing mobility 3 X 10 reps each Focused isolated IP thumb flex & extension with CMC/MP jts pulled into extension stretch.     PROM/Stretching:     Wrist all planes for ^ing mobility x Intermittently with prolonged holds   Thumb all planes focus web space for ^ing mobility x Intermittently with prolonged holds focus web space- Therapist instructed pt to cont with passive stretch to thumb by tenting fingertips over tabletop for home as well as prayer stretch for wrist & fingers combined fairly well today. Pt reports compliance with stretches & putty use at home. Will continue to progress pt per dr request as tolerated for focus ^ing mobility & strength of thumb for ^ing fxl use of hand. 6/28/21 Reassessment  Right  Upper Extremity  AROM           IE 6/02/21 6/28/21  WRIST Flexion 0-70* 60*  ^65*  ^70*       Extension 0-80* 57*  ^70*  70*       Radial Deviation 0-20* 24* ^30*   30*       Ulnar Deviation 0-30* 30*  30* ^35*         Right  Hand ROM    AROM [x]? PROM[]? R  /  L   R/L       THUMB MP Flexion  14-23* H /0-50* 35*/59* ^38*/ 60*  ^40*        IP Flexion  23-30* H/ 0-80* 44*/68* ^96* /70*   45*       Radial Abduction 53-71* 25*/63* ^30* /65*   ^40*       Palmar Abduction 50-71* 44*/ 68* ^47*/ 70*   ^50*            * PROM palmar abduction 46* @ IE followed by improvement to 65* passively 6/28/21    Dynamometer (setting 2): 6/28/21     Left: 125#      Right: 115# ^ing (affected)   Pinch Meter:              Lateral: Left= 17# , Right= 10# (affected)              Palmar 3 point: Left= 15# , Right=  8# (affected)     9 Hole Peg Test: 6/28/21              Left:   :28.70              Right:   :39.25 @ IE followed by todays improved completion time in decreased 28 seconds     QuickDASH Score: 77 % disability at IE improved to 22% disabled on 6/28/21       -Rehab Potential: Good  -Requires OT Follow Up: Yes    Time In:   11:00 am            Time Out:   12:00 pm              Visit #: 14    Units allowed through:  8/11/2021    CODE         Mins          Units Total Used/Total Authorized  59625 Manual 30 2 25/80   60042 Therapeutic Ex 30 2 22/80   17992 Therapeutic Activity   0/80   88015 Fluidotherapy   0/80   93759 ADL/COMP Tech Train   0/80   84962 Paraffin    0/20          Total           -Response to Treatment: Good.  Pt feels he may like new [employer] thumb brace      GOALS (Long term same as Short term): 6/28/21 Reassess  1. ) Patient will demonstrate good understanding of home program (exercises/activities/diagnosis/prognosis/goals) with good accuracy. Goal Met & ongoing  2. ) Patient will demonstrate increased active/passive range of motion of their R thumb, wrist to [facility] for ADL/IADL completion. Goal Progressing Well  3. ) Patient will demonstrate increased /pinch strength of at least 10 / 5 pinch pounds of their Right hand. Goal Met  4. ) Patient to report decreased pain in their affected Right  upper extremity from 6.5 /10 to 3/10 or less with resistive functional use. Goal Met: Goal updated to 0-1/10 pain level to be achieved 6/02/21- pt goal progressing well reports 2-3/10 pain level  5. ) Increase in fine motor function as evidenced by decreased time to complete 9-hole peg test by at least 5-10 seconds. Goal Met  6. ) Patient to report 100% compliance with their splint wear, care, and precautions if needed. Goal Met   7. ) Patient will be knowledgeable of edema control techniques as evident with decreases from mild +  to mild/none. Goal Progressing Well  8. ) Patient will demonstrate a non-tender/non-adherent scar. Goal Progressing Very Well  9. ) Patient will report ADL functions as Mod I/I using R UE>. Goal Progressing Very Well  10 ) Patient will decrease QuickDASH score to 50% or less for increased participation in daily functional activities. Goal Met improved from 77% at IE to 22% disabled on 6/28/21     Plan:   [x]  Continues Plan of care: Treatment covered based on POC and graduated to patient's progress. Pt education continues at each visit to obtain maximum benefits from skilled OT intervention.   [x]  Alter Plan of care: Per Dr. Omi Ramos orders at [address] appt  []  Discharge:      Amarilis Deluca 46, [phone]

## 2021-07-07 NOTE — PROGRESS NOTES
Mary Rogers is a 24 y.o. male who presents for follow up of amputation left lower extremity and left thumb being followed by Dr. Amauri Gregg: Dr. Rashad Bermudez MD  Date of Injury/Surgery: September 2020/ Thumb May 12th  Date last seen in office: 1-27-21    Symptoms: better. Discomfort of hip persists  New complaints:  Friction of prosthetic on leg  Weightbaring:  Weight bearing as tolerated      Assistive device [employer] - lotstrand  Participating in therapy (location if yes)?  yes, For the hand    Refills Needed: None  Order/Referral Needed: N/A
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
## 2021-07-12 ENCOUNTER — HOSPITAL ENCOUNTER (OUTPATIENT)
Dept: OCCUPATIONAL THERAPY | Age: 21
Setting detail: THERAPIES SERIES
Discharge: HOME | End: 2021-07-12
Payer: MEDICAID

## 2021-07-12 PROCEDURE — 97140 MANUAL THERAPY 1/> REGIONS: CPT

## 2021-07-12 PROCEDURE — 97110 THERAPEUTIC EXERCISES: CPT

## 2021-07-12 NOTE — PROGRESS NOTES
Occupational Therapy    OCCUPATIONAL THERAPY PROGRESS NOTE    Date:  2021                         Initial Evaluation Date: 2021    Patient Name:  Francisco Barrientos    :  2000    Restrictions/Precautions:  No strengthening at this time, moderate  Fall-  pt came in [place] - has L LE below knee amputee  Diagnosis:  R thumb CMC thumb contracture release         M24.541                                                Insurance/Certification information:  [employer]  Referring Practitioner:  Dr. Carlos Felipe  Date of Surgery/Injury: sx 2021 - injury 2020  Plan of care signed (Y/N): N  Visit# / total visits: 15/ 20    Pain Level: Very low level achiness reported     Subjective: Pt states,\" My thumb is a little sore today. \"     Objective:  Updated POC completed 21. INTERVENTION: COMPLETED: SPECIFICS/COMMENTS:   Modality:               AROM:                 Digital tendon gliding for ^ing mobility  3 x 10 reps Positions completed with ^ing ease   Opposition for ^ing thumb mobility/decreasing stiffness  x 5 reps Opposition now full   Thumb isolated rad and palm abduction for ^ing thumb mobility X  3 sets of 10 each Decreasing stiffness noted        AAROM:     Wrist all planes for ^ing mobility 2 X 10 reps Tenodesis   Thumb IP/ MP/ CMC flex/ext for ^ing mobility 3 X 10 reps each Focused isolated IP thumb flex & extension with CMC/MP jts pulled into extension stretch. PROM/Stretching:     Wrist all planes for ^ing mobility x Intermittently with prolonged holds   Thumb all planes focus web space for ^ing mobility x Intermittently with prolonged holds focus web space- Therapist instructed pt to cont with passive stretch to thumb by tenting fingertips over tabletop for home as well as prayer stretch for wrist & fingers combined   Composite wrist & digits stretch x Power Web stretches x 15 reps for 5 second holds. ..  Weight Bearing thru palm into putty on tabletop x 20 reps Deviation 0-20* 24* ^30*   30*       Ulnar Deviation 0-30* 30*  30* ^35*         Right  Hand ROM    AROM [x]? PROM[]? R  /  L   R/L       THUMB MP Flexion  14-23* H /0-50* 35*/59* ^38*/ 60*  ^40*        IP Flexion  23-30* H/ 0-80* 44*/68* ^81* /70*   45*       Radial Abduction 53-71* 25*/63* ^30* /65*   ^40*       Palmar Abduction 50-71* 44*/ 68* ^47*/ 70*   ^50*            * PROM palmar abduction 46* @ IE followed by improvement to 65* passively 6/28/21    Dynamometer (setting 2): 6/28/21     Left: 125#      Right: 115# ^ing (affected)   Pinch Meter:              Lateral: Left= 17# , Right= 10# (affected)              Palmar 3 point: Left= 15# , Right=  8# (affected)     9 Hole Peg Test: 6/28/21              Left:   :28.70              Right:   :39.25 @ IE followed by todays improved completion time in decreased 28 seconds     QuickDASH Score: 77 % disability at IE improved to 22% disabled on 6/28/21       -Rehab Potential: Good  -Requires OT Follow Up: Yes    Time In:   3:05 pm            Time Out:   4:05 pm              Visit #: 15    Units allowed through:  8/11/2021    CODE         Mins          Units Total Used/Total Authorized  [id number] Manual 30 2 27/80   28167 Therapeutic Ex 30 2 24/80   31176 Therapeutic Activity   0/80   04497 Fluidotherapy   0/80   51760 ADL/COMP Tech Train   0/80   78541 Paraffin    0/20          Total           -Response to Treatment: Good. GOALS (Long term same as Short term): 6/28/21 Reassess  1. ) Patient will demonstrate good understanding of home program (exercises/activities/diagnosis/prognosis/goals) with good accuracy. Goal Met & ongoing  2. ) Patient will demonstrate increased active/passive range of motion of their R thumb, wrist to [facility] for ADL/IADL completion. Goal Progressing Well  3. ) Patient will demonstrate increased /pinch strength of at least 10 / 5 pinch pounds of their Right hand.  Goal Met  4. ) Patient to report decreased pain in their affected Right  upper extremity from 6.5 /10 to 3/10 or less with resistive functional use. Goal Met: Goal updated to 0-1/10 pain level to be achieved 6/02/21- pt goal progressing well reports 2-3/10 pain level  5. ) Increase in fine motor function as evidenced by decreased time to complete 9-hole peg test by at least 5-10 seconds. Goal Met  6. ) Patient to report 100% compliance with their splint wear, care, and precautions if needed. Goal Met   7. ) Patient will be knowledgeable of edema control techniques as evident with decreases from mild +  to mild/none. Goal Progressing Well  8. ) Patient will demonstrate a non-tender/non-adherent scar. Goal Progressing Very Well  9. ) Patient will report ADL functions as Mod I/I using R UE>. Goal Progressing Very Well  10 ) Patient will decrease QuickDASH score to 50% or less for increased participation in daily functional activities. Goal Met improved from 77% at IE to 22% disabled on 6/28/21     Plan:   [x]  Continues Plan of care: Treatment covered based on POC and graduated to patient's progress. Pt education continues at each visit to obtain maximum benefits from skilled OT intervention.   [x]  Alter Plan of care: Per Dr. Stepan Vargas orders at [address] appt  []  Discharge:      Roderick Deluca 46, [phone]

## 2021-07-19 ENCOUNTER — HOSPITAL ENCOUNTER (OUTPATIENT)
Dept: OCCUPATIONAL THERAPY | Age: 21
Setting detail: THERAPIES SERIES
Discharge: HOME | End: 2021-07-19
Payer: MEDICAID

## 2021-07-19 PROCEDURE — 97140 MANUAL THERAPY 1/> REGIONS: CPT

## 2021-07-19 PROCEDURE — 97110 THERAPEUTIC EXERCISES: CPT

## 2021-07-19 NOTE — PROGRESS NOTES
Occupational Therapy    OCCUPATIONAL THERAPY PROGRESS NOTE    Date:  2021                         Initial Evaluation Date: 2021    Patient Name:  Jena Allen    :  2000    Restrictions/Precautions:  No strengthening at this time, moderate  Fall-  [place] - has L LE below knee amputee  Diagnosis:  R thumb CMC thumb contracture release         M24.541                                                Insurance/Certification information:  [employer]  Referring Practitioner:  Dr. Elodia Montenegro  Date of Surgery/Injury: sx 2021 - injury 2020  Plan of care signed (Y/N): N  Visit# / total visits:     Pain Level: Very low level achiness reported     Subjective: Pt states,\" My hand is feeling a little better on this crutch and I haven't been using any brace. \"     Objective:  Updated POC completed 21. INTERVENTION: COMPLETED: SPECIFICS/COMMENTS:   Modality:               AROM:                 Digital tendon gliding for ^ing mobility  3 x 10 reps Positions completed with ^ing ease   Opposition for ^ing thumb mobility/decreasing stiffness  x 5 reps Opposition now full   Thumb isolated rad and palm abduction for ^ing thumb mobility X  3 sets of 10 each Decreasing stiffness noted        AAROM:     Wrist all planes for ^ing mobility 2 X 10 reps Tenodesis   Thumb IP/ MP/ CMC flex/ext for ^ing mobility 3 X 10 reps each Focused isolated IP thumb flex & extension with CMC/MP jts pulled into extension stretch. PROM/Stretching:     Wrist all planes for ^ing mobility x Intermittently with prolonged holds   Thumb all planes focus web space for ^ing mobility x Intermittently with prolonged holds focus web space- Therapist instructed pt to cont with passive stretch to thumb by tenting fingertips over tabletop for home as well as prayer stretch for wrist & fingers combined   Composite wrist & digits stretch x Power Web stretches x 15 reps for 5 second holds. ..  Weight Bearing thru palm into putty on tabletop x 20 reps   Scar Mass/Edema Control:     Retrograde massage for decreasing swelling x R wrist/ hand / thumb    Desensitization/ scars also x Rolling wrist & thumb across ribbed therabar & theraputty (instructed for home)        Strengthening:     Grasp strengthening x *Yellow large hunk theraputty for gripping, pulling & pinching largely x 5 mins- Pt reports compliance with theraputty for home use- added pushing thumb into putty, rad & palm abd pulls, IP ext scoops  *Red therabar for wringing, up & down bends x 25 reps each  *Red Power Web for grasping & grasping with pulls x 25 reps each   Digits & Wrist flex/ext strengthening 2 x 10 reps * Hands on resistance w/ therapist.. pt reports minimal discomfort with   Pinch strengthening X 15 reps each * Power Web lat pinch pulls, tip pulls    strengthening  *Blue ball for uni & poonam hand tosses, gripping & picking up & placing in various planes with & without forearm twists    Other:     Splint  x pt reports compliance with custom orthoplast PRN   Comfort Cool thumb support brace Right Large x Therapist will cont to monitor for use in lieu of custom orthoplast brace when leaning on crutches if tolerated. HEP  x Reviewed all. Assessment/Comments: Pt is making Good progress toward stated plan of care. Pt arrives to therapy session motivated for tasks. Therapist added pushing thumb into putty, rad & palm abd pulls & IP ext scoops to large hunk theraputty ex's. Pt tolerates ^ in aggressiveness of thumb stretches and other light fxl resistive ex's well today. Thumb mobility conts to ^ while stiffness decreases. Thumb/hand strength conts to ^ also. Pt reports compliance with Comfort Cool brace, stretches & putty use at home. Pt does report he does not have to utilize any brace for comfort when using his crutches anymore.  Will continue to progress pt as tolerated for focus ^ing mobility & strength of thumb for ^ing fxl use of hand.      6/28/21 Reassessment  Right  Upper Extremity  AROM           IE 6/02/21 6/28/21  WRIST Flexion 0-70* 60*  ^65*  ^70*       Extension 0-80* 57*  ^70*  70*       Radial Deviation 0-20* 24* ^30*   30*       Ulnar Deviation 0-30* 30*  30* ^35*         Right  Hand ROM    AROM [x]? PROM[]? R  /  L   R/L       THUMB MP Flexion  14-23* H /0-50* 35*/59* ^38*/ 60*  ^40*        IP Flexion  23-30* H/ 0-80* 44*/68* ^28* /70*   45*       Radial Abduction 53-71* 25*/63* ^30* /65*   ^40*       Palmar Abduction 50-71* 44*/ 68* ^47*/ 70*   ^50*            * PROM palmar abduction 46* @ IE followed by improvement to 65* passively 6/28/21    Dynamometer (setting 2): 6/28/21     Left: 125#      Right: 115# ^ing (affected)   Pinch Meter:              Lateral: Left= 17# , Right= 10# (affected)              Palmar 3 point: Left= 15# , Right=  8# (affected)     9 Hole Peg Test: 6/28/21              Left:   :28.70              Right:   :39.25 @ IE followed by todays improved completion time in decreased 28 seconds     QuickDASH Score: 77 % disability at IE improved to 22% disabled on 6/28/21       -Rehab Potential: Good  -Requires OT Follow Up: Yes    Time In:   4:00 pm            Time Out:   5:00 pm              Visit #: 16    Units allowed through:  8/11/2021    CODE         Mins          Units Total Used/Total Authorized  [id number] Manual 30 2 2980   08154 Therapeutic Ex 30 2 26/80   81761 Therapeutic Activity   0/80   98631 Fluidotherapy   0/80   40066 ADL/COMP Tech Train   0/80   33687 Paraffin    0/20          Total           -Response to Treatment: Good. GOALS (Long term same as Short term): 6/28/21 Reassess  1. ) Patient will demonstrate good understanding of home program (exercises/activities/diagnosis/prognosis/goals) with good accuracy. Goal Met & ongoing  2. ) Patient will demonstrate increased active/passive range of motion of their R thumb, wrist to [facility] for ADL/IADL completion.  Goal Progressing Well  3. ) Patient will demonstrate increased /pinch strength of at least 10 / 5 pinch pounds of their Right hand. Goal Met  4. ) Patient to report decreased pain in their affected Right  upper extremity from 6.5 /10 to 3/10 or less with resistive functional use. Goal Met: Goal updated to 0-1/10 pain level to be achieved 6/02/21- pt goal progressing well reports 2-3/10 pain level  5. ) Increase in fine motor function as evidenced by decreased time to complete 9-hole peg test by at least 5-10 seconds. Goal Met  6. ) Patient to report 100% compliance with their splint wear, care, and precautions if needed. Goal Met   7. ) Patient will be knowledgeable of edema control techniques as evident with decreases from mild +  to mild/none. Goal Progressing Well  8. ) Patient will demonstrate a non-tender/non-adherent scar. Goal Progressing Very Well  9. ) Patient will report ADL functions as Mod I/I using R UE>. Goal Progressing Very Well  10 ) Patient will decrease QuickDASH score to 50% or less for increased participation in daily functional activities. Goal Met improved from 77% at IE to 22% disabled on 6/28/21     Plan:   [x]  Continues Plan of care: Treatment covered based on POC and graduated to patient's progress. Pt education continues at each visit to obtain maximum benefits from skilled OT intervention.   [x]  Alter Plan of care: Per Dr. Cassy Cruz orders at [address] appt  []  Discharge:      Ashley Deluca 46, [id number]

## 2021-07-21 ENCOUNTER — HOSPITAL ENCOUNTER (OUTPATIENT)
Dept: OCCUPATIONAL THERAPY | Age: 21
Setting detail: THERAPIES SERIES
Discharge: HOME | End: 2021-07-21
Payer: MEDICAID

## 2021-07-21 ENCOUNTER — OFFICE VISIT (OUTPATIENT)
Dept: ORTHOPEDIC SURGERY | Age: 21
End: 2021-07-21
Payer: MEDICAID

## 2021-07-21 VITALS — TEMPERATURE: 98.4 F

## 2021-07-21 DIAGNOSIS — Z89.612 S/P AKA (ABOVE KNEE AMPUTATION), LEFT (HCC): Primary | Chronic | ICD-10-CM

## 2021-07-21 PROCEDURE — G8417 CALC BMI ABV UP PARAM F/U: HCPCS | Performed by: ORTHOPAEDIC SURGERY

## 2021-07-21 PROCEDURE — 99212 OFFICE O/P EST SF 10 MIN: CPT | Performed by: ORTHOPAEDIC SURGERY

## 2021-07-21 PROCEDURE — G8427 DOCREV CUR MEDS BY ELIG CLIN: HCPCS | Performed by: ORTHOPAEDIC SURGERY

## 2021-07-21 PROCEDURE — 97110 THERAPEUTIC EXERCISES: CPT

## 2021-07-21 PROCEDURE — 1036F TOBACCO NON-USER: CPT | Performed by: ORTHOPAEDIC SURGERY

## 2021-07-21 PROCEDURE — 97140 MANUAL THERAPY 1/> REGIONS: CPT

## 2021-07-21 NOTE — PROGRESS NOTES
Francisco Barrientos is a 24 y.o. male who presents for follow up of Lower Left Leg     SURGEON: Dr. Katty Spring MD  Date of Injury/Surgery: August 2020  Date last seen in office: 7/7/2021    Symptoms: unchanged  New complaints: Femur gives patient pain, described as a poking pain at the highest the pain reaches 7/10.     Cast/Splint, Brace, or Dressings: Well fitting

## 2021-07-21 NOTE — PROGRESS NOTES
Occupational Therapy    OCCUPATIONAL THERAPY PROGRESS NOTE    Date:  2021                         Initial Evaluation Date: 2021    Patient Name:  Jena Allen    :  2000    Restrictions/Precautions:  No strengthening at this time, moderate  Fall-  [place] - has L LE below knee amputee  Diagnosis:  R thumb CMC thumb contracture release         M24.541                                                Insurance/Certification information:  [employer]  Referring Practitioner:  Dr. Elodia Montenegro  Date of Surgery/Injury: sx 2021 - injury 2020  Plan of care signed (Y/N): N  Visit# / total visits:     Pain Level: Very low level achiness reported     Subjective: Pt states,\" All those big putty ex's we did last time did not seem to bother my thumb. \"     Objective:  Updated POC completed 21. INTERVENTION: COMPLETED: SPECIFICS/COMMENTS:   Modality:               AROM:                 Digital tendon gliding for ^ing mobility  3 x 10 reps Positions completed with ^ing ease   Opposition for ^ing thumb mobility/decreasing stiffness  x 5 reps Opposition now full   Thumb isolated rad and palm abduction for ^ing thumb mobility X  3 sets of 10 each Decreasing stiffness noted        AAROM:     Wrist all planes for ^ing mobility 2 X 10 reps Tenodesis   Thumb IP/ MP/ CMC flex/ext for ^ing mobility 3 X 10 reps each Focused isolated IP thumb flex & extension with CMC/MP jts pulled into extension stretch. PROM/Stretching:     Wrist all planes for ^ing mobility x Intermittently with prolonged holds   Thumb all planes focus web space for ^ing mobility x Intermittently with prolonged holds focus web space- Therapist instructed pt to cont with passive stretch to thumb by tenting fingertips over tabletop for home as well as prayer stretch for wrist & fingers combined   Composite wrist & digits stretch x Power Web stretches x 15 reps for 5 second holds. ..  Weight Bearing thru palm into putty on tabletop x 20 reps   Scar Mass/Edema Control:     Retrograde massage for decreasing swelling x R wrist/ hand / thumb    Desensitization/ scars also x Rolling wrist & thumb across ribbed therabar & theraputty (instructed for home)        Strengthening:     Grasp strengthening x *Yellow large hunk theraputty for gripping, pulling & pinching largely x 5 mins- Pt reports compliance with theraputty for home use-  pushing thumb into putty, rad & palm abd pulls, IP ext scoops  *Red therabar for wringing, up & down bends x 25 reps each  *Red Power Web for grasping & grasping with pulls x 25 reps each   Digits & Wrist flex/ext strengthening 2 x 10 reps *Hands on resistance w/ therapist.. pt reports minimal discomfort with   Pinch strengthening X 15 reps each * Power Web lat pinch pulls, tip pulls    strengthening  *Blue ball for uni & poonam hand tosses, gripping & picking up & placing in various planes with & without forearm twists    Other:     Splint  x pt reports compliance with custom orthoplast PRN   Comfort Cool thumb support brace Right Large x Therapist will cont to monitor for use in lieu of custom orthoplast brace when leaning on crutches if tolerated. HEP  x Reviewed all. Assessment/Comments: Pt is making Good progress toward stated plan of care. Pt arrives to therapy session motivated for tasks reporting his thumb was okay after more ex's were added last session. Pt conts to tolerate ^ in aggressiveness of thumb stretches well. Thumb web space mobility conts to ^ while stiffness decreases. Thumb/hand strength & endurance cont to ^. Pt reports compliance with ROM, stretches & putty use at home. Will continue to progress pt as tolerated for focus ^ing mobility & strength of thumb for ^ing fxl use of hand.       6/28/21 Reassessment  Right  Upper Extremity  AROM           IE 6/02/21 6/28/21  WRIST Flexion 0-70* 60*  ^65*  ^70*       Extension 0-80* 57*  ^70*  70*       Radial Deviation 0-20* 24* ^30*   30*       Ulnar Deviation 0-30* 30*  30* ^35*         Right  Hand ROM    AROM [x]? PROM[]? R  /  L   R/L       THUMB MP Flexion  14-23* H /0-50* 35*/59* ^38*/ 60*  ^40*        IP Flexion  23-30* H/ 0-80* 44*/68* ^34* /70*   45*       Radial Abduction 53-71* 25*/63* ^30* /65*   ^40*       Palmar Abduction 50-71* 44*/ 68* ^47*/ 70*   ^50*            * PROM palmar abduction 46* @ IE followed by improvement to 65* passively 6/28/21    Dynamometer (setting 2): 6/28/21     Left: 125#      Right: 115# ^ing (affected)   Pinch Meter:              Lateral: Left= 17# , Right= 10# (affected)              Palmar 3 point: Left= 15# , Right=  8# (affected)     9 Hole Peg Test: 6/28/21              Left:   :28.70              Right:   :39.25 @ IE followed by todays improved completion time in decreased 28 seconds     QuickDASH Score: 77 % disability at IE improved to 22% disabled on 6/28/21       -Rehab Potential: Good  -Requires OT Follow Up: Yes    Time In:   11:00 am            Time Out:   12:00 pm              Visit #: 17    Units allowed through:  8/11/2021    CODE         Mins          Units Total Used/Total Authorized  34566 Manual 30 2 31/80   80449 Therapeutic Ex 30 2 28/80   97011 Therapeutic Activity   0/80   39658 Fluidotherapy   0/80   18416 ADL/COMP Tech Train   0/80   22938 Paraffin    0/20          Total           -Response to Treatment: Good. GOALS (Long term same as Short term): 6/28/21 Reassess  1. ) Patient will demonstrate good understanding of home program (exercises/activities/diagnosis/prognosis/goals) with good accuracy. Goal Met & ongoing  2. ) Patient will demonstrate increased active/passive range of motion of their R thumb, wrist to [facility] for ADL/IADL completion. Goal Progressing Well  3. ) Patient will demonstrate increased /pinch strength of at least 10 / 5 pinch pounds of their Right hand.  Goal Met  4. ) Patient to report decreased pain in their affected Right  upper extremity from 6.5 /10 to 3/10 or less with resistive functional use. Goal Met: Goal updated to 0-1/10 pain level to be achieved 6/02/21- pt goal progressing well reports 2-3/10 pain level  5. ) Increase in fine motor function as evidenced by decreased time to complete 9-hole peg test by at least 5-10 seconds. Goal Met  6. ) Patient to report 100% compliance with their splint wear, care, and precautions if needed. Goal Met   7. ) Patient will be knowledgeable of edema control techniques as evident with decreases from mild +  to mild/none. Goal Progressing Well  8. ) Patient will demonstrate a non-tender/non-adherent scar. Goal Progressing Very Well  9. ) Patient will report ADL functions as Mod I/I using R UE>. Goal Progressing Very Well  10 ) Patient will decrease QuickDASH score to 50% or less for increased participation in daily functional activities. Goal Met improved from 77% at IE to 22% disabled on 6/28/21     Plan:   [x]  Continues Plan of care: Treatment covered based on POC and graduated to patient's progress. Pt education continues at each visit to obtain maximum benefits from skilled OT intervention.   [x]  Alter Plan of care: Per Dr. Amauri Muñoz orders at [address] appt  []  Discharge:      Ayanna Deluca 17, [phone]

## 2021-07-21 NOTE — PATIENT INSTRUCTIONS
Continue wearing lower fitting socket    Call in 2 to 3 weeks if wound still has not resolved or healed    Call with any questions, concerns, or need for reevaluation

## 2021-07-21 NOTE — PROGRESS NOTES
Chief Complaint   Patient presents with    Leg Pain     Left Lower Leg. 5/10 pn. Pn comes out of no where from time to time. Patient is able to sleep throughout the night without pn. No Atlered sensation at the time. Pt expressed that after sitting for a long duration of time, his back toward his left side will start to have increased pain. SUBJECTIVE: Patient is back in today for a wound check on an ulcer over his left above the knee or through the knee amputation site. He has a significant mount of bone grafting and heterotopic bone. His ulcer has gotten smaller and also his prosthetists fit him with a lower fitting socket to not irritate this area. He denies any pain, drainage, fevers, or chills. Review of Systems -   General ROS: negative for - chills, fatigue, fever or night sweats  Respiratory ROS: no cough, shortness of breath, or wheezing  Cardiovascular ROS: no chest pain or dyspnea on exertion  Gastrointestinal ROS: no abdominal pain, change in bowel habits, or black or bloody stools  Genitourinary: no hematuria, dysuria, or incontinence   Musculoskeletal ROS:see above  Neurological ROS: no TIA or stroke symptoms       OBJECTIVE:   Alert and oriented X 3, no acute distress, respirations easy and unlabored with no audible wheezes, skin warm and dry, speech and dress appropriate for noted age, affect euthymic. Extremity:    Legs at baseline ulcer has started to contract down and get smaller. Is very superficial in nature. There is no signs of infection. His stump still appears very viable. He does have heterotopic ossification a split thickness skin graft is taken and healed well and is mature. XR: 7/21/21     Temp 98.4 °F (36.9 °C) (Oral)     ASSESSMENT:     Diagnosis Orders   1.  S/P AKA (above knee amputation), left (HCC)         PLAN:    Continue wearing shorter fitting prosthesis    Follow-up as needed    Call if ulcers not healed in 2 to 3 weeks for reevaluation also call with

## 2021-07-28 ENCOUNTER — HOSPITAL ENCOUNTER (OUTPATIENT)
Dept: OCCUPATIONAL THERAPY | Age: 21
Setting detail: THERAPIES SERIES
Discharge: HOME | End: 2021-07-28
Payer: MEDICAID

## 2021-07-28 ENCOUNTER — HOSPITAL ENCOUNTER (OUTPATIENT)
Dept: PHYSICAL THERAPY | Age: 21
Setting detail: THERAPIES SERIES
Discharge: HOME | End: 2021-07-28
Payer: MEDICAID

## 2021-07-28 PROCEDURE — 97140 MANUAL THERAPY 1/> REGIONS: CPT

## 2021-07-28 PROCEDURE — 97110 THERAPEUTIC EXERCISES: CPT

## 2021-07-28 PROCEDURE — 97161 PT EVAL LOW COMPLEX 20 MIN: CPT | Performed by: PHYSICAL THERAPIST

## 2021-07-28 ASSESSMENT — PAIN SCALES - GENERAL: PAINLEVEL_OUTOF10: 4

## 2021-07-28 ASSESSMENT — PAIN DESCRIPTION - DESCRIPTORS: DESCRIPTORS: ACHING

## 2021-07-28 ASSESSMENT — PAIN DESCRIPTION - FREQUENCY: FREQUENCY: INTERMITTENT

## 2021-07-28 ASSESSMENT — PAIN DESCRIPTION - LOCATION: LOCATION: BACK

## 2021-07-28 ASSESSMENT — PAIN DESCRIPTION - PAIN TYPE: TYPE: CHRONIC PAIN

## 2021-07-28 NOTE — PROGRESS NOTES
stretches   Composite wrist & digits stretch x Power Web stretches x 15 reps for 5 second holds. .. Weight Bearing thru palm into putty on tabletop x 20 reps   Scar Mass/Edema Control:     Retrograde massage for decreasing swelling x R wrist/ hand / thumb    Desensitization/ scars also x Rolling wrist & thumb across ribbed therabar & theraputty (instructed for home)        Strengthening:     Grasp strengthening x *Yellow large hunk theraputty for gripping, pulling & pinching largely x 5 mins- Pt reports compliance with theraputty for home use-  pushing thumb into putty, rad & palm abd pulls, IP ext scoops  *Red therabar for wringing, up & down bends x 25 reps each  *Red Power Web for grasping & grasping with pulls x 25 reps each   Digits & Wrist flex/ext strengthening ^d 3 x 10 reps *Hands on resistance w/ therapist.. therapists conts to ^ aggressiveness- pt reports minimal discomfort with   Pinch strengthening  * Power Web lat pinch pulls, tip pulls    strengthening x *Blue ball for uni & poonam hand tosses, gripping & picking up & placing in various planes with & without forearm twists    Other:     Splint  x pt reports compliance with custom orthoplast PRN   Comfort Cool thumb support brace Right Large x Therapist will cont to monitor for use in lieu of custom orthoplast brace when leaning on crutches if tolerated. HEP  x Reviewed all. Assessment/Comments: Pt is making Good progress toward stated plan of care. Pt arrives to therapy session motivated for tasks. Pt reports minimal discomfort in his thumb with daily tasks. Edema level slight MP area thumb. Pts AROM thumb extension & web space opening improves /stiffness decreases pre to post tx. Pt conts to tolerate ^ in aggressiveness of thumb stretches well. Thumb/hand strength & endurance cont to ^. Therapist reassessed  & pinch strength with improvements noted below. Pt reports compliance with ROM, stretches & putty use at home.  Will continue to progress pt as tolerated for focus ^ing mobility & strength of thumb for ^ing fxl use of hand. 6/28/21 Reassessment   Right  Upper Extremity  AROM           IE 6/02/21 6/28/21  WRIST Flexion 0-70* 60*  ^65*  ^70*       Extension 0-80* 57*  ^70*  70*       Radial Deviation 0-20* 24* ^30*   30*       Ulnar Deviation 0-30* 30*  30* ^35*         Right  Hand ROM    AROM [x]? PROM[]? R  /  L   R/L       THUMB MP Flexion  14-23* H /0-50* 35*/59* ^38*/ 60*  ^40*        IP Flexion  23-30* H/ 0-80* 44*/68* ^06* /70*   45*       Radial Abduction 53-71* 25*/63* ^30* /65*   ^40*       Palmar Abduction 50-71* 44*/ 68* ^47*/ 70*   ^50*            * PROM palmar abduction 46* @ IE followed by improvement to 65* passively 6/28/21    STRENGTH:  Dynamometer (setting 2): 6/28/21 followed by 7/28/21 Reassessment     Left: 125#      Right: 115# ^ing (affected) . ... 7/28/21 reassessed ^d 118#  Pinch Meter:              Lateral: Left= 17# , Right= 10# (affected). .. 7/28/21 reassessed ^d 15# (Left 16#)              Palmar 3 point: Left= 15# , Right=  8# (affected). .. 7/28/21 reassessed ^d 14# ( Left 15#)     9 Hole Peg Test: 6/28/21              Left:   :28.70              Right:   :39.25 @ IE followed by todays improved completion time in decreased 28 seconds     QuickDASH Score: 77 % disability at IE improved to 22% disabled on 6/28/21       -Rehab Potential: Good  -Requires OT Follow Up: Yes    Time In:   11:00 am            Time Out:   12:00 pm              Visit #: 18    Units allowed through:  8/11/2021    CODE         Mins          Units Total Used/Total Authorized  [id number] Manual 30 2 33/80   81884 Therapeutic Ex 30 2 30/80   53369 Therapeutic Activity   0/80   19193 Fluidotherapy   0/80   35856 ADL/COMP Tech Train   0/80   51019 Paraffin    0/20          Total           -Response to Treatment: Good.        GOALS (Long term same as Short term): 6/28/21 Reassess  1. ) Patient will demonstrate good understanding of home program (exercises/activities/diagnosis/prognosis/goals) with good accuracy. Goal Met & ongoing  2. ) Patient will demonstrate increased active/passive range of motion of their R thumb, wrist to [facility] for ADL/IADL completion. Goal Progressing Well  3. ) Patient will demonstrate increased /pinch strength of at least 10 / 5 pinch pounds of their Right hand. Goal Met  4. ) Patient to report decreased pain in their affected Right  upper extremity from 6.5 /10 to 3/10 or less with resistive functional use. Goal Met: Goal updated to 0-1/10 pain level to be achieved 6/02/21- pt goal progressing well reports 2-3/10 pain level  5. ) Increase in fine motor function as evidenced by decreased time to complete 9-hole peg test by at least 5-10 seconds. Goal Met  6. ) Patient to report 100% compliance with their splint wear, care, and precautions if needed. Goal Met   7. ) Patient will be knowledgeable of edema control techniques as evident with decreases from mild +  to mild/none. Goal Progressing Well  8. ) Patient will demonstrate a non-tender/non-adherent scar. Goal Progressing Very Well  9. ) Patient will report ADL functions as Mod I/I using R UE>. Goal Progressing Very Well  10 ) Patient will decrease QuickDASH score to 50% or less for increased participation in daily functional activities. Goal Met improved from 77% at IE to 22% disabled on 6/28/21     Plan:   [x]  Continues Plan of care: Treatment covered based on POC and graduated to patient's progress. Pt education continues at each visit to obtain maximum benefits from skilled OT intervention.   [x]  Alter Plan of care: Per Dr. Jose De Jesus Burdick orders at [address] appt  []  [facility]:      [facility], [phone]
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
## 2021-08-02 ENCOUNTER — HOSPITAL ENCOUNTER (OUTPATIENT)
Dept: OCCUPATIONAL THERAPY | Age: 21
Setting detail: THERAPIES SERIES
Discharge: HOME | End: 2021-08-02
Payer: MEDICAID

## 2021-08-02 PROCEDURE — 97110 THERAPEUTIC EXERCISES: CPT

## 2021-08-02 PROCEDURE — 97140 MANUAL THERAPY 1/> REGIONS: CPT

## 2021-08-02 NOTE — PROGRESS NOTES
Occupational Therapy    OCCUPATIONAL THERAPY PROGRESS / REASSESSMENT NOTE    Date:  2021                         Initial Evaluation Date: 2021    Patient Name:  Linda Barton    :  2000    Restrictions/Precautions:  No strengthening at this time, moderate  Fall-  pt came in in w[place] - has L LE below knee amputee  Diagnosis:  R thumb CMC thumb contracture release         M24.541                                                Insurance/Certification information:  [employer]  Referring Practitioner:  Dr. Yovany Morfin  Date of Surgery/Injury: sx 2021 - injury 2020  Plan of care signed (Y/N): N  Visit# / total visits:     Pain Level: Very low level achiness reported     Subjective: Pt states,\" My thumb is doing good, yes? \"    Objective:  Updated POC completed today 21. INTERVENTION: COMPLETED: SPECIFICS/COMMENTS:   Modality:               AROM:                 Digital tendon gliding for ^ing mobility  3 x 10 reps Positions completed with ^ing ease   Opposition for ^ing thumb mobility/decreasing stiffness  x 5 reps Opposition now full   Thumb isolated rad and palm abduction for ^ing thumb mobility X  3 sets of 10 each Decreasing stiffness noted        AAROM:     Wrist all planes for ^ing mobility 2 X 10 reps Tenodesis   Thumb IP/ MP/ CMC flex/ext for ^ing mobility 3 X 10 reps each Focused isolated IP thumb flex & extension with CMC/MP jts pulled into extension stretch. PROM/Stretching:     Wrist all planes for ^ing mobility x Intermittently with prolonged holds- ^ing aggressiveness   Thumb all planes focus web space for [employer] mobility x Intermittently with prolonged holds focus web space-  Therapist conts to [employer] of prolonged passive stretches   Composite wrist & digits stretch x Power Web stretches x ^d 20 reps for 5 second holds. ..  Weight Bearing thru palm into putty on tabletop x 20 reps   Scar Mass/Edema Control:     Retrograde massage for decreasing swelling x R wrist/ hand / thumb    Desensitization/ scars also x Rolling wrist & thumb across ribbed therabar & theraputty (instructed for home)        Strengthening:     Grasp strengthening x *Yellow large hunk theraputty for gripping, pulling & pinching largely x 5 mins- Pt reports compliance with theraputty for home use-  pushing thumb into putty, rad & palm abd pulls, IP ext scoops  *Red therabar for wringing, up & down bends x ^d 30 reps each  *Red Power Web for grasping & grasping with pulls x ^d 30 reps each   Digits & Wrist flex/ext strengthening  3 x 10 reps *Hands on resistance w/ therapist.. therapists conts to ^ aggressiveness- pt reports minimal discomfort with   Pinch strengthening x * Power Web lat pinch pulls, tip pulls     strengthening x *Black hand gripper level 4 : 4 x 10 reps   Other:     Splint  x pt reports compliance with custom orthoplast PRN   Comfort Cool thumb support brace Right Large x Therapist will cont to monitor for use in lieu of custom orthoplast brace when leaning on crutches if tolerated. HEP  x Reviewed all. Assessment/Comments: Pt is making Good progress toward stated plan of care. Pt arrives to therapy session motivated for tasks reporting he thinks his thumb is doing good. Edema level slight MP area thumb. Scar areas healing well with only slight sensitivity noted. Pts AROM thumb extension & web space opening improves /stiffness decreases pre to post tx. Pt conts to tolerate ^ in aggressiveness of thumb stretches with minimal c/o's of discomfort reported. Thumb/hand strength & endurance cont to ^ while pt is completing large hunk theraputty ex's- ^ing time being utilized. Therapist completed a reassessment this date. Refer to below for noted improvements. Pt reports compliance with ROM, stretches & putty use at home. Will continue to progress pt as tolerated for focus ^ing mobility & strength of thumb for ^ing fxl use of hand.       8/02/21 Reassessment Right  Upper Extremity  AROM           IE 6/02/21 6/28/21 8/02/21  WRIST Flexion 0-70* 60*  ^65*  ^70* 70*      Extension 0-80* 57*  ^70*  70* 70*      Radial Deviation 0-20* 24* ^30*   30* ^35*     Ulnar Deviation 0-30* 30*  30* ^35*   35*       Right  Hand ROM    AROM [x]? PROM[]? R  /  L   R/L       THUMB MP Flexion  14-23* H /0-50* 35*/59* ^38*/ 60*  ^40*  ^ 50*      IP Flexion  23-30* H/ 0-80* 44*/68* Aristobulus.Hammer* /70*   45*  45*     Radial Abduction 53-71* 25*/63* ^30* /65*   ^40*  40*     Palmar Abduction 50-71* 44*/ 68* ^47*/ 70*   ^50*  50*       STRENGTH:  Dynamometer (setting 2): 6/28/21 followed by 8/02/21 Reassessment     Left: 125#      Right: 115# ^ing (affected) . ... 8/02/21 reassessed ^d 118#  Pinch Meter:              Lateral: Left= 17# , Right= 10# (affected). .. 8/02/21 reassessed ^d 15# (Left 16#)              Palmar 3 point: Left= 15# , Right=  8# (affected). .. 8/02/21 reassessed ^d 14# ( Left 15#)     9 Hole Peg Test: 8/02/21              Left:   :28.70              Right:   :39.25 @ IE followed by todays improved completion time in decreased 28 seconds and remains WNL's     QuickDASH Score: 77 % disability at IE improved to 22%        -Rehab Potential: Good  -Requires OT Follow Up: Yes    Time In:   4:00 pm            Time Out:   5:00 pm              Visit #: 19    Units allowed through:  8/11/2021    CODE         Mins          Units Total Used/Total Authorized  [id number] Manual 30 2 35/80   10595 Therapeutic Ex 30 2 32/80   75000 Therapeutic Activity   0/80   16687 Fluidotherapy   0/80   42032 ADL/COMP Tech Train   0/80   14206 Paraffin    0/20          Total           -Response to Treatment: Good. Pt is happy to see his progress      GOALS (Long term same as Short term): 8/02/21 Reassessment  1. ) Patient will demonstrate good understanding of home program (exercises/activities/diagnosis/prognosis/goals) with good accuracy.  Goal Met & ongoing  2. ) Patient will demonstrate

## 2021-08-04 ENCOUNTER — OFFICE VISIT (OUTPATIENT)
Dept: ORTHOPEDIC SURGERY | Age: 21
End: 2021-08-04
Payer: MEDICAID

## 2021-08-04 ENCOUNTER — HOSPITAL ENCOUNTER (OUTPATIENT)
Dept: OCCUPATIONAL THERAPY | Age: 21
Setting detail: THERAPIES SERIES
Discharge: HOME | End: 2021-08-04
Payer: MEDICAID

## 2021-08-04 VITALS — TEMPERATURE: 98.3 F

## 2021-08-04 DIAGNOSIS — S88.912D TRAUMATIC AMPUTATION OF LEFT LOWER EXTREMITY, SUBSEQUENT ENCOUNTER (HCC): Primary | ICD-10-CM

## 2021-08-04 PROCEDURE — G8427 DOCREV CUR MEDS BY ELIG CLIN: HCPCS | Performed by: ORTHOPAEDIC SURGERY

## 2021-08-04 PROCEDURE — 97110 THERAPEUTIC EXERCISES: CPT

## 2021-08-04 PROCEDURE — 99212 OFFICE O/P EST SF 10 MIN: CPT

## 2021-08-04 PROCEDURE — 99212 OFFICE O/P EST SF 10 MIN: CPT | Performed by: ORTHOPAEDIC SURGERY

## 2021-08-04 PROCEDURE — 97140 MANUAL THERAPY 1/> REGIONS: CPT

## 2021-08-04 PROCEDURE — G8417 CALC BMI ABV UP PARAM F/U: HCPCS | Performed by: ORTHOPAEDIC SURGERY

## 2021-08-04 PROCEDURE — 1036F TOBACCO NON-USER: CPT | Performed by: ORTHOPAEDIC SURGERY

## 2021-08-04 NOTE — PROGRESS NOTES
Occupational Therapy    OCCUPATIONAL THERAPY PROGRESS NOTE    Date:  2021                         Initial Evaluation Date: 2021    Patient Name:  Pennie Kern    :  2000    Restrictions/Precautions:  No strengthening at this time, moderate  Fall-  pt came in in w[place] - has L LE below knee amputee  Diagnosis:  R thumb CMC thumb contracture release         M24.541                                                Insurance/Certification information:  [employer]  Referring Practitioner:  Dr. Radha Singh  Date of Surgery/Injury: sx 2021 - injury 2020  Plan of care signed (Y/N): N  Visit# / total visits:     Pain Level: Very low level achiness reported     Subjective: Pt states,\" I'm pinching & pulling this big putty with my brother now - look! \"    Objective:  Updated POC completed 21. INTERVENTION: COMPLETED: SPECIFICS/COMMENTS:   Modality:               AROM:                 Digital tendon gliding for ^ing mobility  3 x 10 reps Positions completed with ^ing ease   Opposition for ^ing thumb mobility/decreasing stiffness  x 5 reps Opposition now full   Thumb isolated rad and palm abduction for ^ing thumb mobility X  3 sets of 10 each Decreasing stiffness noted        AAROM:     Wrist all planes for ^ing mobility 2 X 10 reps Tenodesis   Thumb IP/ MP/ CMC flex/ext for ^ing mobility 3 X 10 reps each Focused isolated IP thumb flex & extension with CMC/MP jts pulled into extension stretch. PROM/Stretching:     Wrist all planes for ^ing mobility x Intermittently with prolonged holds- ^ing aggressiveness   Thumb all planes focus web space for [employer] mobility x Intermittently with prolonged holds focus web space-  Therapist conts to [employer] of prolonged passive stretches   Composite wrist & digits stretch x Power Web stretches x  20 reps for 5 second holds. ..  Weight Bearing thru palm into putty on tabletop x 20 reps   Scar Mass/Edema Control:     Retrograde massage for decreasing swelling x R wrist/ hand / thumb    Desensitization/ scars also x Rolling wrist & thumb across ribbed therabar & theraputty (instructed for home)        Strengthening:     Grasp strengthening x *Yellow large hunk theraputty for gripping, pulling & pinching largely x 5 - ^d 10 mins- Pt reports compliance with theraputty for home use- pushing thumb into putty, rad & palm abd pulls, IP ext scoops  *Red therabar for wringing, up & down bends x ^d 30 reps each  *Red Power Web for grasping & grasping with pulls x ^d 30 reps each   Digits & Wrist flex/ext strengthening  3 x 10 reps *Hands on resistance w/ therapist... therapists conts to ^ aggressiveness- pt reports minimal discomfort with   Pinch strengthening x *Power Web lat pinch pulls, tip pulls - pt completes with increasing ease    strengthening x *Black hand gripper level 4 : ^d 5 x 10 reps   Other:     Splint  x pt reports compliance with custom orthoplast PRN   Comfort Cool thumb support brace Right Large x Therapist will cont to monitor for use in lieu of custom orthoplast brace when leaning on crutches if tolerated. HEP  x Reviewed all. Assessment/Comments: Pt is making Good progress toward stated plan of care. Pt arrives to therapy session motivated for tasks. Pt reports mild discomfort with end range thumb stretches. Pt reports & displays improving ability to squeeze & pinch large tub of putty. Pts thumb extension improves pre to post tx. Pt also displays improving ease when squeezing hand gripper at level 4. Pt reports compliance with ROM, stretches & putty use at home. Will continue to progress pt as tolerated for focus ^ing mobility & strength of thumb for ^ing fxl use of hand.       8/02/21 Reassessment   Right  Upper Extremity  AROM           IE 6/02/21 6/28/21 8/02/21  WRIST Flexion 0-70* 60*  ^65*  ^70* 70*      Extension 0-80* 57*  ^70*  70* 70*      Radial Deviation 0-20* 24* ^30*   30* ^29*     Ulnar Deviation 0-30* 30*  30* ^35*   35*       Right  Hand ROM    AROM [x]? PROM[]? R  /  L   R/L       THUMB MP Flexion  14-23* H /0-50* 35*/59* ^38*/ 60*  ^40*  ^ 50*      IP Flexion  23-30* H/ 0-80* 44*/68* Mykah.Long* /70*   45*  45*     Radial Abduction 53-71* 25*/63* ^30* /65*   ^40*  40*     Palmar Abduction 50-71* 44*/ 68* ^47*/ 70*   ^50*  50*       STRENGTH:  Dynamometer (setting 2): 6/28/21 followed by 8/02/21 Reassessment     Left: 125#      Right: 115# ^ing (affected) . ... 8/02/21 reassessed ^d 118#  Pinch Meter:              Lateral: Left= 17# , Right= 10# (affected). .. 8/02/21 reassessed ^d 15# (Left 16#)              Palmar 3 point: Left= 15# , Right=  8# (affected). .. 8/02/21 reassessed ^d 14# ( Left 15#)     9 Hole Peg Test: 8/02/21              Left:   :28.70              Right:   :39.25 @ IE followed by todays improved completion time in decreased 28 seconds and remains WNL's     QuickDASH Score: 77 % disability at IE improved to 22%        -Rehab Potential: Good  -Requires OT Follow Up: Yes    Time In:   11:00 am            Time Out:   12:00 pm              Visit #: 20    Units allowed through:  8/11/2021    CODE         Mins          Units Total Used/Total Authorized  [id number] Manual 30 2 37/80   26381 Therapeutic Ex 30 2 34/80   66699 Therapeutic Activity   0/80   77366 Fluidotherapy   0/80   52281 ADL/COMP Tech Train   0/80   03788 Paraffin    0/20          Total           -Response to Treatment: Good. GOALS (Long term same as Short term): 8/02/21 Reassessment  1. ) Patient will demonstrate good understanding of home program (exercises/activities/diagnosis/prognosis/goals) with good accuracy. Goal Met & ongoing  2. ) Patient will demonstrate increased active/passive range of motion of their R thumb, wrist to [facility] for ADL/IADL completion. Goal Progressing Well  3. ) Patient will demonstrate increased /pinch strength of at least 10 / 5 pinch pounds of their Right hand.  Goal Met  4. ) Patient to report decreased pain in their affected Right  upper extremity from 6.5 /10 to 3/10 or less with resistive functional use. Goal Met: Goal updated to 0-1/10 pain level to be achieved by next reassess pt goal progressing well reports 2/10 pain level  5. ) Increase in fine motor function as evidenced by decreased time to complete 9-hole peg test by at least 5-10 seconds. Goal Met  6. ) Patient to report 100% compliance with their splint wear, care, and precautions if needed. Goal Met   7. ) Patient will be knowledgeable of edema control techniques as evident with decreases from mild +  to mild/none. Goal Progressing Well  8. ) Patient will demonstrate a non-tender/non-adherent scar. Goal Met  9. ) Patient will report ADL functions as Mod I/I using R UE>. Goal Progressing Very Well  10 ) Patient will decrease QuickDASH score to 50% or less for increased participation in daily functional activities. Goal Met improved from 77% at IE to 22% disabled on 6/28/21- continuing to improve     Plan:   [x]  Continues Plan of care: Treatment covered based on POC and graduated to patient's progress. Pt education continues at each visit to obtain maximum benefits from skilled OT intervention.   []  Alter Plan of care:   []  Discharge:      Tamanna WHITE/KAVITHA, [id number]

## 2021-08-04 NOTE — PROGRESS NOTES
Rosie Bay is a 24 y.o. male who presents for follow up of Above the left knee amputation wound check. SURGEON: Dr. Axel Johansen MD  Date of Injury/Surgery: September 2020  Date last seen in office: 7-21-21    Symptoms: unchanged  New complaints: Superficial  Open area reportedly keeps reopening due to friction of  Prosthetic( anterior upper thigh)      Assistive device Crutches - axillary  Participating in therapy (location if yes)?  [facility] ([facility]  [employer]    Refills Needed: None  Order/Referral Needed: N/A

## 2021-08-04 NOTE — PROGRESS NOTES
Chief Complaint   Patient presents with    Follow-up      Wound check above the  left knee amputation       SUBJECTIVE: She is seen in office today continues to have a sore on his left anterior medial thigh over split-thickness skin graft from his prosthesis. He does have prominent femoral shaft in that area. The wound has not really gotten worse but is not improving it does bother him when he is wearing his prosthesis. Review of Systems -   General ROS: negative for - chills, fatigue, fever or night sweats  Respiratory ROS: no cough, shortness of breath, or wheezing  Cardiovascular ROS: no chest pain or dyspnea on exertion  Gastrointestinal ROS: no abdominal pain, change in bowel habits, or black or bloody stools  Genitourinary: no hematuria, dysuria, or incontinence   Musculoskeletal ROS:see above  Neurological ROS: no TIA or stroke symptoms       OBJECTIVE:   Alert and oriented X 3, no acute distress, respirations easy and unlabored with no audible wheezes, skin warm and dry, speech and dress appropriate for noted age, affect euthymic. Extremity:    Examination is unchanged the wound is granulating in nicely there is no signs of infection. Is stump is still viable. He has good range of motion of the stump. Temp 98.3 °F (36.8 °C) (Oral)     ASSESSMENT:     Diagnosis Orders   1. Traumatic amputation of left lower extremity, subsequent encounter [facility])  Elysia Mosley MD, Orthopaedics, [place]       PLAN:  Will be to send him to Dr. Joesph Fraser MD for second opinion on potential muscle transfer versus a prosthetic clinic or plastic surgery for further padding of his femoral shaft to prevent his sore from his prosthesis.       ELECTRONICALLY signed by:    Jai Huang MD  8/4/21

## 2021-08-09 ENCOUNTER — HOSPITAL ENCOUNTER (OUTPATIENT)
Dept: OCCUPATIONAL THERAPY | Age: 21
Setting detail: THERAPIES SERIES
Discharge: HOME | End: 2021-08-09
Payer: MEDICAID

## 2021-08-09 PROCEDURE — 97110 THERAPEUTIC EXERCISES: CPT

## 2021-08-09 PROCEDURE — 97140 MANUAL THERAPY 1/> REGIONS: CPT

## 2021-08-09 NOTE — PROGRESS NOTES
Occupational Therapy    OCCUPATIONAL THERAPY PROGRESS NOTE    Date:  2021                         Initial Evaluation Date: 2021    Patient Name:  Tran Espinosa    :  2000    Restrictions/Precautions:  No strengthening at this time, moderate  Fall-  pt came in in [place] - has L LE below knee amputee  Diagnosis:  R thumb CMC thumb contracture release         M24.541                                                Insurance/Certification information:  [employer]  Referring Practitioner:  Dr. Cade Rucker  Date of Surgery/Injury: sx 2021 - injury 2020  Plan of care signed (Y/N): N  Visit# / total visits:     Pain Level: Very low level achiness reported     Subjective: Pt states,\" I start PT with my prosthetic leg this week too. \"    Objective:  Updated POC completed 21. INTERVENTION: COMPLETED: SPECIFICS/COMMENTS:   Modality:               AROM:                 Digital tendon gliding for ^ing mobility  3 x 10 reps Positions completed with ^ing ease   Opposition for ^ing thumb mobility/decreasing stiffness  x 5 reps Opposition now full   Thumb isolated rad and palm abduction for ^ing thumb mobility X  3 sets of 10 each Decreasing stiffness noted- stretches at end of motion        AAROM:     Wrist all planes for ^ing mobility 2 X 10 reps Tenodesis   Thumb IP/ MP/ CMC flex/ext for ^ing mobility 3 X 10 reps each Focused isolated IP thumb flex & extension with CMC/MP jts pulled into extension stretch. PROM/Stretching:     Wrist all planes for ^ing mobility x Intermittently with prolonged holds into extension- ^ing aggressiveness   Thumb all planes focus web space for [employer] mobility x Intermittently with prolonged holds focus web space-  Therapist conts to [employer] of prolonged passive stretches   Composite wrist & digits stretch x Power Web stretches x  20 reps for 5 second holds. ..  Weight Bearing thru palm into putty on tabletop x 20 reps   Scar Mass/Edema Control:     Retrograde massage for decreasing swelling x R wrist/ hand / thumb    Desensitization/ scars also x Rolling wrist & thumb across ribbed therabar & theraputty (instructed for home)        Strengthening:     Grasp strengthening x *Yellow large hunk theraputty for gripping, pulling & pinching largely x 10 mins- also pushing thumb into putty, rad & palm abd pulls, IP ext scoops  *Red therabar for wringing, up & down bends x 30 reps each  *Red Power Web for grasping & grasping with pulls x 30 reps each   Digits & Wrist flex/ext strengthening  3 x 10 reps *Hands on resistance w/ therapist... therapists conts to ^ aggressiveness- pt reports minimal discomfort with   Pinch strengthening x *Power Web lat pinch pulls, tip pulls - pt completes with increasing ease    strengthening x *Black hand gripper level 4 :  5 x 10 reps   Other:     Splint   pt reports compliance with custom orthoplast PRN   Comfort Cool thumb support brace Right Large x  for use when leaning on crutches if tolerated. HEP  x Reviewed all. Assessment/Comments: Pt is making Good progress toward stated plan of care. Pt arrives to therapy session motivated for tasks. Pt  displays improving ability to squeeze & pinch with large tub of putty. Pts thumb extension improves pre to post tx MP joint. Pt also displays improving ease when squeezing hand gripper at level 4. Pts  strength conts to ^. Pt reports mild discomfort with end range thumb stretches. Therapist conts to encourage pt with ROM, stretches & putty use at home. Therapist will confer with pt re: web space custom stretcher splint next session. Will continue to progress pt as tolerated for focus ^ing mobility & strength of thumb for ^ing fxl use of hand.       8/02/21 Reassessment   Right  Upper Extremity  AROM           IE 6/02/21 6/28/21 8/02/21  WRIST Flexion 0-70* 60*  ^65*  ^70* 70*      Extension 0-80* 57*  ^70*  70* 70*      Radial Deviation 0-20* 24* Mabel Adan  30* ^35*     Ulnar Deviation 0-30* 30*  30* ^35*   35*       Right  Hand ROM    AROM [x]? PROM[]? R  /  L   R/L       THUMB MP Flexion  14-23* H /0-50* 35*/59* ^38*/ 60*  ^40*  ^ 50*      IP Flexion  23-30* H/ 0-80* 44*/68* Aldo.Cm* /70*   45*  45*     Radial Abduction 53-71* 25*/63* ^30* /65*   ^40*  40*     Palmar Abduction 50-71* 44*/ 68* ^47*/ 70*   ^50*  50*       STRENGTH:  Dynamometer (setting 2): 6/28/21 followed by 8/02/21 Reassessment     Left: 125#      Right: 115# ^ing (affected) . ... 8/02/21 reassessed ^d 118#  Pinch Meter:              Lateral: Left= 17# , Right= 10# (affected). .. 8/02/21 reassessed ^d 15# (Left 16#)              Palmar 3 point: Left= 15# , Right=  8# (affected). .. 8/02/21 reassessed ^d 14# ( Left 15#)     9 Hole Peg Test: 8/02/21              Left:   :28.70              Right:   :39.25 @ IE followed by todays improved completion time in decreased 28 seconds and remains WNL's     QuickDASH Score: 77 % disability at IE improved to 22%        -Rehab Potential: Good  -Requires OT Follow Up: Yes    Time In:   4:00 pm            Time Out:   5:00 pm              Visit #: 21    Units allowed through:  8/11/2021    CODE         Mins          Units Total Used/Total Authorized  [id number] Manual 30 2 39/80   43318 Therapeutic Ex 30 2 36/80   14367 Therapeutic Activity   0/80   04064 Fluidotherapy   0/80   04306 ADL/COMP Tech Train   0/80   66849 Paraffin    0/20          Total           -Response to Treatment: Good. GOALS (Long term same as Short term): 8/02/21 Reassessment  1. ) Patient will demonstrate good understanding of home program (exercises/activities/diagnosis/prognosis/goals) with good accuracy. Goal Met & ongoing  2. ) Patient will demonstrate increased active/passive range of motion of their R thumb, wrist to [facility] for ADL/IADL completion. Goal Progressing Well  3. ) Patient will demonstrate increased /pinch strength of at least 10 / 5 pinch pounds of their Right hand. Goal Met  4. ) Patient to report decreased pain in their affected Right  upper extremity from 6.5 /10 to 3/10 or less with resistive functional use. Goal Met: Goal updated to 0-1/10 pain level to be achieved by next reassess pt goal progressing well reports 2/10 pain level  5. ) Increase in fine motor function as evidenced by decreased time to complete 9-hole peg test by at least 5-10 seconds. Goal Met  6. ) Patient to report 100% compliance with their splint wear, care, and precautions if needed. Goal Met   7. ) Patient will be knowledgeable of edema control techniques as evident with decreases from mild +  to mild/none. Goal Progressing Well  8. ) Patient will demonstrate a non-tender/non-adherent scar. Goal Met  9. ) Patient will report ADL functions as Mod I/I using R UE>. Goal Progressing Very Well  10 ) Patient will decrease QuickDASH score to 50% or less for increased participation in daily functional activities. Goal Met improved from 77% at IE to 22% disabled on 6/28/21- continuing to improve     Plan:   [x]  Continues Plan of care: Treatment covered based on POC and graduated to patient's progress. Pt education continues at each visit to obtain maximum benefits from skilled OT intervention.   []  Alter Plan of care:   []  Discharge:      Hermes WHITE/KAVITHA, [id number]

## 2021-08-11 ENCOUNTER — HOSPITAL ENCOUNTER (OUTPATIENT)
Dept: OCCUPATIONAL THERAPY | Age: 21
Setting detail: THERAPIES SERIES
Discharge: HOME | End: 2021-08-11
Payer: MEDICAID

## 2021-08-11 ENCOUNTER — HOSPITAL ENCOUNTER (OUTPATIENT)
Dept: PHYSICAL THERAPY | Age: 21
Setting detail: THERAPIES SERIES
Discharge: HOME | End: 2021-08-11
Payer: MEDICAID

## 2021-08-11 PROCEDURE — 97530 THERAPEUTIC ACTIVITIES: CPT

## 2021-08-11 PROCEDURE — 97110 THERAPEUTIC EXERCISES: CPT

## 2021-08-11 PROCEDURE — 97140 MANUAL THERAPY 1/> REGIONS: CPT

## 2021-08-11 NOTE — PROGRESS NOTES
Occupational Therapy    OCCUPATIONAL THERAPY PROGRESS / DISCHARGE NOTE    Date:  2021                         Initial Evaluation Date: 2021    Patient Name:  Mino Doe    :  2000    Restrictions/Precautions:  No strengthening at this time, moderate  Fall-  [place] in [place] - has L LE below knee amputee  Diagnosis:  R thumb CMC thumb contracture release         M24.541                                                Insurance/Certification information:  [employer]  Referring Practitioner:  Dr. Koki Rodney  Date of Surgery/Injury: sx 2021 - injury 2020  Plan of care signed (Y/N): N  Visit# / total visits: 25   (21 insurance end date for services)    Pain Level: Very low level achiness reported     Subjective: Pt states,\" I start college classes Monday. I start PT with my prosthetic leg this week too. We are still trying to get that spot to heal on my leg. My thumb is doing good. \"    Objective:  Updated POC / D/C reassessment completed today 21. INTERVENTION: COMPLETED: SPECIFICS/COMMENTS:   Modality:               AROM:                 Digital tendon gliding for ^ing mobility  3 x 10 reps Positions completed with ^ing ease   Opposition for ^ing thumb mobility/decreasing stiffness  x 5 reps Opposition now full   Thumb isolated rad and palm abduction for ^ing thumb mobility X  3 sets of 10 each Decreasing stiffness noted- stretches at end of motion        AAROM:     Wrist all planes for ^ing mobility 2 X 10 reps Tenodesis   Thumb IP/ MP/ CMC flex/ext for ^ing mobility 3 X 10 reps each Focused isolated IP thumb flex & extension with CMC/MP jts pulled into extension stretch.     PROM/Stretching:     Wrist all planes for ^ing mobility x Intermittently with prolonged holds into extension- ^ing aggressiveness   Thumb all planes focus web space for [employer] mobility x Intermittently with prolonged holds focus web space-  Therapist conts to [employer] of prolonged passive stretches   Composite wrist & digits stretch x Power Web stretches x  20 reps for 5 second holds. .. Weight Bearing thru palm into putty on tabletop x 20 reps   Scar Mass/Edema Control:     Retrograde massage for decreasing swelling x R wrist/ hand / thumb    Desensitization/ scars also x Rolling wrist & thumb across ribbed therabar & theraputty (instructed for home)        Strengthening:     Grasp strengthening x *Yellow large hunk theraputty for gripping, pulling & pinching largely x 10 mins- also pushing thumb into putty, rad & palm abd pulls, IP ext scoops  *Red therabar for wringing, up & down bends x 30 reps each  *Red Power Web for grasping & grasping with pulls x 30 reps each   Digits & Wrist flex/ext strengthening  3 x 10 reps *Hands on resistance w/ therapist... therapists conts to ^ aggressiveness- pt reports minimal discomfort with   Pinch strengthening x *Power Web lat pinch pulls, tip pulls - pt completes with increasing ease    strengthening x *Black hand gripper level 4 :  5 x 10 reps   Other:     Splint   pt reports compliance with custom orthoplast PRN   Comfort Cool thumb support brace Right Large x  for use when leaning on crutches if tolerated. HEP  x Reviewed all. Assessment/Comments: Pt has made Good progress toward stated Plan of Care meeting 8 out of 10 goals. Pt arrives to todays OT session very motivated for tasks. Pt tolerated todays session well with no changes made to tx plan. Therapist reviewed & updated pts HEP. Therapist completed a discharge reassessment for D/C today. Refer for noted improvements. 8/11/21 Discharge Reassessment   Right  Upper Extremity  AROM           IE 6/02/21 6/28/21 8/02/21 8/11/21  WRIST Flexion 0-70* 60*  ^65*  ^70* 70*  70*     Extension 0-80* 57*  ^70*  70* 70*  70*     Radial Deviation 0-20* 24* ^30*   30* ^35* 35*     Ulnar Deviation 0-30* 30*  30* ^35*   35*  37*      Right  Hand ROM    AROM [x]? PROM[]? R  /  L   R/L        THUMB MP Flexion  14-23* H /0-50* 35*/59* ^38*/ 60*  ^40*  ^ 50*  Devendra.Medfield*     IP Flexion  23-30* H/ 0-80* 44*/68* Edel.Domenica* /70*   45*  45* ^60*     Radial Abduction 53-71* 25*/63* ^30* /65*   ^40*  40* Edel.Domenica*     Palmar Abduction 50-71* 44*/ 68* ^47*/ 70*   ^50*  50* Devendra.Medfield*     * Pts palm is not able to go flat against tabletop with Rad Abd    STRENGTH:  Dynamometer (setting 2): 6/28/21 followed by 8/02/21 Reassessment & 8/11/21 Reassess for D/C    Left: 125#      Right : 115# ^ing (affected) . ... 8/02/21 reassessed ^d 118#. .. 8/11/21 improved to 125#   Pinch Meter:              Lateral: Left= 17# , Right= 10# (affected). .. 8/11/21 reassessed 15# (Left 16#)              Palmar 3 point: Left= 15# , Right=  8# (affected). .. 8/11/21 reassessed 14# ( Left 15#)     9 Hole Peg Test: 8/11/21              Left:   :28.70              Right:   :39.25. ... reassessed 8/11/21 @ 28 seconds and remains WNL's     QuickDASH Score: 77 % disability at IE improved to 22% . ... improved to 14% disabled 8/11/21 for D/C       -Rehab Potential: Good  -Requires OT Follow Up: No    Time In:   11:00 am            Time Out:   12:00 pm              Visit #: 22    Units allowed through:  8/11/2021    CODE         Mins          Units Total Used/Total Authorized  [id number] Manual 30 2 41/80   68123 Therapeutic Ex 30 2 38/80   88596 Therapeutic Activity   0/80   33894 Fluidotherapy   0/80   87174 ADL/COMP Tech Train   0/80   46071 Paraffin    0/20          Total           -Response to Treatment: Good. Pt is happy with the progress his thumb has made      GOALS (Long term same as Short term): 8/11/21 Reassessment  1. ) Patient will demonstrate good understanding of home program (exercises/activities/diagnosis/prognosis/goals) with good accuracy. Goal Met  2. ) Patient will demonstrate increased active/passive range of motion of their R thumb, wrist to [facility] for ADL/IADL completion.  Goal Met  3. ) Patient will demonstrate increased /pinch strength of at least 10 / 5 pinch pounds of their Right hand. Goal Met  4. ) Patient to report decreased pain in their affected Right  upper extremity from 6.5 /10 to 3/10 or less with resistive functional use. Goal Met: Goal updated to 0-1/10 pain level to be achieved by next reassess pt goal progressing well reports 1-2/10 pain level on 8/11/21 Updated Goal Progressed Well  5. ) Increase in fine motor function as evidenced by decreased time to complete 9-hole peg test by at least 5-10 seconds. Goal Met  6. ) Patient to report 100% compliance with their splint wear, care, and precautions if needed. Goal Met   7. ) Patient will be knowledgeable of edema control techniques as evident with decreases from mild +  to mild/none. Goal Met  8. ) Patient will demonstrate a non-tender/non-adherent scar. Goal Met  9. ) Patient will report ADL functions as Mod I/I using R UE>. Goal Progressing Very Well  10 ) Patient will decrease QuickDASH score to 50% or less for increased participation in daily functional activities. Goal Met improved from 77% at IE to 22% disabled on 6/28/21-  improved at 14% disabled for D/C on 8/11/21     Plan:   []  [address] of care: Treatment covered based on POC and graduated to patient's progress. Pt education continues at each visit to obtain maximum benefits from skilled OT intervention. []  Alter Plan of care:   [x]  Discharge: Pt discharged with updated HEP this date.  Instructed pt to follow up with referring physician    Kiya WHITE/KAVITHA, [id number]

## 2021-08-18 ENCOUNTER — HOSPITAL ENCOUNTER (OUTPATIENT)
Dept: PHYSICAL THERAPY | Age: 21
Setting detail: THERAPIES SERIES
Discharge: HOME | End: 2021-08-18
Payer: MEDICAID

## 2021-08-18 PROCEDURE — 97530 THERAPEUTIC ACTIVITIES: CPT

## 2021-08-18 PROCEDURE — 97112 NEUROMUSCULAR REEDUCATION: CPT

## 2021-08-18 NOTE — PROGRESS NOTES
[facility]  Phone: [phone] Fax: [phone]       Physical Therapy Daily Treatment Note  Date:  2021    Patient Name:  Tamie Baker    :  2000  MRN: [id number]    Restrictions/Precautions:    Diagnosis: Traumatic Amputation Left Lower Extremity (2020)   Treatment Diagnosis:    Insurance/Certification information: [facility]   (96 units each [id number]-bowrp ex, 51225-ddwin act, 84065-FY re-ed, 76404-DCUQXZ)  thru 10/31/2021  Referring Practitioner: Sonu Black PA-C   Plan of care signed (Y/N):    Visit# / total visits:  2/8 weeks   Pain level: /10  Time In: 13:10       Time Out: 14:00         Subjective: Pt has no new c/o this afternoon. States he was doing a pull up at the gym and his hand slipped and he fell. Denies and injury. Exercises:  Exercise/Equipment Resistance/Repetitions Other comments   Sit to stand from 20\" box     w/R foot slightly fwd  10 x  5 x w/2 HHA    Standing wt shift   //bars Tactile cue   LLE stand, R step fwd/bck 15 x Facilitate wt shift over LLE   Side stepping  GTB 4 laps    Backward walking   5 laps Hip extension                                                                                              Other Therapeutic Activities:Today's session focused on functional mobility with prosthesis. Cued for midline posture and even weight on LE's. Practiced gait mechanics in //bars with decreasing UE support using mirror for feedback. Cued fwd weight shift over LLE. Standing balance: OH ball transfer w/light ball 10 x                                Catch/throw light ball.      Home Exercise Program:      Manual Treatments:      Modalities:      Timed Code Treatment Minutes: 50    Total Treatment Minutes:  50    Treatment/Activity Tolerance:  [x] Patient tolerated treatment well [] Patient limited by fatigue  [] Patient limited by pain  [] Patient limited by other medical complications  [x] Other: Good tolerance for progression of activities today. Fair carry over in gait. Plan:   [x] Continue per plan of care [] Alter current plan (see comments)  [] Plan of care initiated [] Hold pending MD visit [] Discharge  Plan for Next Session:         Treatment Charges: Mins Units   Initial Evaluation     Re-Evaluation     Ther Exercise         TE     Manual Therapy     MT     Ther Activities        TA 35 2   Gait Training          GT     Neuro Re-education NR 15 1   Modalities     Non-Billable Service Time     Other     Total Time/Units 50 3     Electronically signed by:   Gerald Miguel

## 2021-08-30 ENCOUNTER — HOSPITAL ENCOUNTER (OUTPATIENT)
Dept: PHYSICAL THERAPY | Age: 21
Setting detail: THERAPIES SERIES
Discharge: HOME | End: 2021-08-30
Payer: MEDICAID

## 2021-08-30 PROCEDURE — 97112 NEUROMUSCULAR REEDUCATION: CPT

## 2021-08-30 PROCEDURE — 97530 THERAPEUTIC ACTIVITIES: CPT

## 2021-08-30 NOTE — PROGRESS NOTES
[facility]  Phone: [phone] Fax: [phone]       Physical Therapy Daily Treatment Note  Date:  2021    Patient Name:  Jena Allen    :  2000  MRN: [id number]    Restrictions/Precautions:    Diagnosis: Traumatic Amputation Left Lower Extremity (2020)   Treatment Diagnosis:    Insurance/Certification information: [address] [facility]   (96 units each 12993-ecflu ex, 80957-tludp act, 93974-FP re-ed, 97140-manual)  thru 10/31/2021  Referring Practitioner: Kelli Calvo PA-C   Plan of care signed (Y/N):    Visit# / total visits:  3/8 weeks   Pain level: /10  Time In: 13:00       Time Out: 13:40         Subjective: Pt states he only wears his prosthesis when he goes to class and comes to therapy d/t the sore that is still present on his anterior leg. States his appointment with Dr. Elisa Eli was rescheduled for next week d/t doctor having to leave for an emergency surgery. Exercises:  Exercise/Equipment Resistance/Repetitions Other comments   Sit to stand from 20\" box      10 x      Standing wt shift   //bars Tactile cue   LLE stand, R step fwd/bck 20 x Facilitate wt shift over LLE   Side stepping  GTB 4 laps    Backward walking   5 laps                          nt Hip extension                                                                                              Other Therapeutic Activities:Today's session focused on functional mobility with prosthesis. Cued for midline posture and even weight on LE's. Ambulated 225' with focus on midline posture and fwd weight shift over LLE.         Standing balance: OH ball transfer w/light ball 10 x                                Frontal ball transfer w/trunk rotation B                                      Home Exercise Program:      Manual Treatments:      Modalities:      Timed Code Treatment Minutes: 40    Total Treatment Minutes:  40    Treatment/Activity Tolerance:  [x] Patient tolerated treatment well [] Patient limited by fatigue  [] Patient limited by pain  [] Patient limited by other medical complications  [x] Other: Pt is limiting wear of prosthesis at this time d/t the sore on his leg. Plan:   [x] Continue per plan of care [] Alter current plan (see comments)  [] Plan of care initiated [] Hold pending MD visit [] Discharge  Plan for Next Session:         Treatment Charges: Mins Units   Initial Evaluation     Re-Evaluation     Ther Exercise         TE     Manual Therapy     MT     Ther Activities        TA 30 2   Gait Training          GT     Neuro Re-education NR 10 1   Modalities     Non-Billable Service Time     Other     Total Time/Units 40 3     Electronically signed by:   Gerald Miguel

## 2021-09-07 ENCOUNTER — OFFICE VISIT (OUTPATIENT)
Dept: ORTHOPEDIC SURGERY | Age: 21
End: 2021-09-07
Payer: MEDICAID

## 2021-09-07 VITALS
DIASTOLIC BLOOD PRESSURE: 81 MMHG | HEIGHT: 72 IN | SYSTOLIC BLOOD PRESSURE: 127 MMHG | WEIGHT: 210 LBS | BODY MASS INDEX: 28.44 KG/M2 | HEART RATE: 92 BPM

## 2021-09-07 DIAGNOSIS — S88.912D TRAUMATIC AMPUTATION OF LEFT LOWER EXTREMITY, SUBSEQUENT ENCOUNTER (HCC): Primary | ICD-10-CM

## 2021-09-07 PROCEDURE — 1036F TOBACCO NON-USER: CPT | Performed by: PHYSICIAN ASSISTANT

## 2021-09-07 PROCEDURE — 99212 OFFICE O/P EST SF 10 MIN: CPT | Performed by: ORTHOPAEDIC SURGERY

## 2021-09-07 PROCEDURE — G8428 CUR MEDS NOT DOCUMENT: HCPCS | Performed by: PHYSICIAN ASSISTANT

## 2021-09-07 PROCEDURE — G8417 CALC BMI ABV UP PARAM F/U: HCPCS | Performed by: PHYSICIAN ASSISTANT

## 2021-09-07 PROCEDURE — 99213 OFFICE O/P EST LOW 20 MIN: CPT | Performed by: PHYSICIAN ASSISTANT

## 2021-09-07 NOTE — PROGRESS NOTES
Patient here for a second opinion, for traumatic amputation of the LLE. Patient is using crutches for assistance. Has no new concerns.

## 2021-09-07 NOTE — PATIENT INSTRUCTIONS
Dr. Yoly Bradford MD   [facility]   [address] [facility]     To evaluate for an [employer]seointegrated prosthesis for the left lower extremity  To be evaluated by orthopedics as well as plastic surgery    For appointment contact [phone]

## 2021-09-08 ENCOUNTER — HOSPITAL ENCOUNTER (OUTPATIENT)
Dept: PHYSICAL THERAPY | Age: 21
Setting detail: THERAPIES SERIES
Discharge: HOME | End: 2021-09-08
Payer: MEDICAID

## 2021-09-08 NOTE — FLOWSHEET NOTE
[facility]  Phone: [phone] Fax: [phone]     Physical Therapy  Cancellation/No-show Note  Patient Name:  Vance Frye  :  2000   Date:  2021    For today's appointment patient:  [x]  Cancelled  []  Rescheduled appointment  []  No-show     Reason given by patient:  [x]  Patient ill  []  Conflicting appointment  []  No transportation    []  Conflict with work  []  No reason given  []  Other:     Comments:  Pt came to therapy sounding hoarse. States he has a sore throat. Temp 98.4  Advised pt that per department policy he can not attend therapy today d/t illness. Pt also is not wearing his prosthesis d/t irritation of open wound on anterior leg. States he plans to call orthotist.   Pt also reports he saw Dr. Ema Piper and is being referred to a physician in [place]. He is exploring a prosthesis with a titanium implant into his residual limb. Advised pt to call next week regarding prosthesis. Will discuss with PT. Electronically signed by:   Gerald Miguel

## 2021-09-27 ENCOUNTER — HOSPITAL ENCOUNTER (OUTPATIENT)
Dept: PHYSICAL THERAPY | Age: 21
Setting detail: THERAPIES SERIES
Discharge: HOME | End: 2021-09-27
Payer: MEDICAID

## 2021-09-27 PROCEDURE — 97530 THERAPEUTIC ACTIVITIES: CPT

## 2021-09-27 NOTE — PROGRESS NOTES
[facility]  Phone: [phone] Fax: [phone]       Physical Therapy Daily Treatment Note  Date:  2021    Patient Name:  Mary Rogers    :  2000  MRN: [id number]    Restrictions/Precautions:    Diagnosis: Traumatic Amputation Left Lower Extremity (2020)   Treatment Diagnosis:    Insurance/Certification information: [facility] [facility]   (96 units each [id number]-wlagu ex, 97227-wrakx act, 85109-QE re-ed, 97140-manual)  thru 10/31/2021  Referring Practitioner: Meka Julian PA-C   Plan of care signed (Y/N):    Visit# / total visits:  4/8 weeks   Pain level: /10  Time In: 10:15       Time Out: 11:00         Subjective: Pt returns after having his prosthesis further modified to prevent rubbing/irritation. States he has been wearing it approx 9 hours/day. Pt ambulates using one axillary crutch. Voices he would like to amb without AD and has done limited walking without it. Cautioned pt to use device for safety as well as proper mechanics. Exercises:  Exercise/Equipment Resistance/Repetitions Other comments   Sit to stand from 20\" box     w/R foot slightly fwd  5 x  5 x      Standing wt shift   //bars                     nt Tactile cue   LLE stand, R step fwd/bck 20 x Facilitate wt shift over LLE   Side stepping  GTB 4 laps    Backward walking   5 laps                          nt Hip extension      Standing hip abduction 2 x 10                         extension 2 x 10                                                                                      Other Therapeutic Activities:Today's session focused on functional mobility with prosthesis. Cued for midline posture and even weight on LE's. Practiced gait mechanics in //bars with decreasing UE support using mirror for feedback. Cued fwd weight shift over LLE. Stepping fwd/back with RLE using 2 HH support to facilitate weight transfer onto left prosthesis.   Ambulated 225' with focus on midline posture and fwd weight shift over LLE. Standing balance:   Home Exercise Program: (9/27/2021) Instructed pt in standing hip abduction and extension to be performed daily at home. Manual Treatments:      Modalities:      Timed Code Treatment Minutes: 45    Total Treatment Minutes:  45    Treatment/Activity Tolerance:  [x] Patient tolerated treatment well [] Patient limited by fatigue  [] Patient limited by pain  [] Patient limited by other medical complications  [x] Other: Good tolerance for exercises/activities today. Pt expressed understanding of correct gait mechanics and need for further strengthening of left hip abductors and extensors. Muscle fatigue in left hip with weight bearing/strengthening activities. With practice and slower pace, pt exhibited improvement in gait mechanics with better midline posture and fwd weight transfer over prosthesis. Plan:   [x] Continue per plan of care [] Alter current plan (see comments)  [] Plan of care initiated [] Hold pending MD visit [] Discharge  Plan for Next Session:         Treatment Charges: Mins Units   Initial Evaluation     Re-Evaluation     Ther Exercise         TE     Manual Therapy     MT     Ther Activities        TA 45 3   Gait Training          GT     Neuro Re-education NR     Modalities     Non-Billable Service Time     Other     Total Time/Units 45 3     Electronically signed by:   Gerald Miguel

## 2021-09-30 ENCOUNTER — HOSPITAL ENCOUNTER (OUTPATIENT)
Dept: PHYSICAL THERAPY | Age: 21
Setting detail: THERAPIES SERIES
Discharge: HOME | End: 2021-09-30
Payer: MEDICAID

## 2021-09-30 PROCEDURE — 97110 THERAPEUTIC EXERCISES: CPT

## 2021-09-30 NOTE — PROGRESS NOTES
[facility]  Phone: [phone] Fax: [phone]       Physical Therapy Daily Treatment Note  Date:  2021    Patient Name:  Jassi Kennedy    :  2000  MRN: [id number]    Restrictions/Precautions:    Diagnosis: Traumatic Amputation Left Lower Extremity (2020)   Treatment Diagnosis:    Insurance/Certification information: [address] [facility]   (96 units each [id number]-hmdln ex, 61227-molew act, 56575-EC re-ed, 92312-JUFYGG)  thru 10/31/2021  Referring Practitioner: Jayne Rose PA-C   Plan of care signed (Y/N):    Visit# / total visits:  5/8 weeks   Pain level: /10  Time In: 16:00       Time Out: 16:40         Subjective: Pt has no new c/o. Exercises:  Exercise/Equipment Resistance/Repetitions Other comments   Sit to stand from 20\" box    10 x     Mirror for feedback   Standing wt shift   //bars                     nt Tactile cue   LLE stand, R step fwd/bck 20 x                      nt Facilitate wt shift over LLE   RLE high/knee flex/ext 2 x 10 Facilitate wt on LLE   Side stepping  GTB 4 laps    Backward walking   4 laps                           Hip extension      Standing hip abduction 2 x 10                         extension 2 x 10                                                                                      Other Therapeutic Activities:  Practiced gait mechanics in //bars with decreasing UE support using mirror for feedback. Cued fwd weight shift over LLE. Ambulated 15' x 4 without AD w/CGA. Pt demonstrates improvement in left hip stability however still exhibits deficits when amb without AD. Ambulated 225' w/one lofstrand crutch with focus on midline posture and fwd weight shift over LLE. .  Standing balance:   Home Exercise Program: (2021) Instructed pt in standing hip abduction and extension to be performed daily at home.       Manual Treatments:      Modalities:      Timed Code Treatment Minutes: 40    Total Treatment Minutes: 40    Treatment/Activity Tolerance:  [x] Patient tolerated treatment well [] Patient limited by fatigue  [] Patient limited by pain  [] Patient limited by other medical complications  [] Other:     Plan:   [x] Continue per plan of care [] Alter current plan (see comments)  [] Plan of care initiated [] Hold pending MD visit [] Discharge  Plan for Next Session:         Treatment Charges: Mins Units   Initial Evaluation     Re-Evaluation     Ther Exercise         TE     Manual Therapy     MT     Ther Activities        TA 40 3   Gait Training          GT     Neuro Re-education NR     Modalities     Non-Billable Service Time     Other     Total Time/Units 40 3     Electronically signed by:   Gerald Miguel

## 2021-10-04 ENCOUNTER — HOSPITAL ENCOUNTER (OUTPATIENT)
Dept: PHYSICAL THERAPY | Age: 21
Setting detail: THERAPIES SERIES
Discharge: HOME | End: 2021-10-04
Payer: MEDICAID

## 2021-10-04 PROCEDURE — 97110 THERAPEUTIC EXERCISES: CPT

## 2021-10-04 NOTE — PROGRESS NOTES
[facility]  Phone: [phone] Fax: [phone]       Physical Therapy Daily Treatment Note  Date:  10/4/2021    Patient Name:  Roberta Smith    :  2000  MRN: [id number]    Restrictions/Precautions:    Diagnosis: Traumatic Amputation Left Lower Extremity (2020)   Treatment Diagnosis:    Insurance/Certification information: [address] [facility]   (96 units each 60996-zfiuk ex, 76880-oqdjn act, 37954-RT re-ed, 97140-manual)  thru 10/31/2021  Referring Practitioner: Alpesh Carpio PA-C   Plan of care signed (Y/N):    Visit# / total visits:  6/8 weeks   Pain level: /10  Time In: 11:05       Time Out: 11:40         Subjective: Pt reports he has another area that is opening/irritated on anterior thigh. He reports no pain with ambualtion. Called prosthetist this morning. He will have pt come in to check possible options for protecting grafted skin area. Exercises:  Exercise/Equipment Resistance/Repetitions Other comments   Sit to stand from 20\" box    2 x 5     Mirror for feedback   Standing wt shift   //bars                      Tactile cue   LLE stand, R step fwd/bck 10 x                       Facilitate wt shift over LLE   RLE high/knee flex/ext 2 x 10 Facilitate wt on LLE   Side stepping  GTB 4 laps    Backward walking   4 laps                           Hip extension      Standing hip abduction 2 x 10                         extension 2 x 10                                                                                      Other Therapeutic Activities:  Practiced gait mechanics in //bars with decreasing UE support using mirror for feedback. Cued fwd weight shift over LLE. Standing diagonal lift med ball (3kg) to the left to facilitate weight shift onto LLE/prosthesis. Rotational reach behind w/med ball (3kg) facilitate trunk rotation and weight shift.      Ambulated 225' w/one lofstrand crutch with focus on midline posture and fwd weight shift over LLE. .  Standing balance:   Home Exercise Program: (9/27/2021) Instructed pt in standing hip abduction and extension to be performed daily at home. Manual Treatments:      Modalities:      Timed Code Treatment Minutes: 35    Total Treatment Minutes:  35    Treatment/Activity Tolerance:  [x] Patient tolerated treatment well [] Patient limited by fatigue  [] Patient limited by pain  [] Patient limited by other medical complications  [x] Other: Pt is tolerating exercises/activities well. He is demonstrating increased endurance for activity. Plan:   [x] Continue per plan of care [] Alter current plan (see comments)  [] Plan of care initiated [] Hold pending MD visit [] Discharge  Plan for Next Session:         Treatment Charges: Mins Units   Initial Evaluation     Re-Evaluation     Ther Exercise         TE     Manual Therapy     MT     Ther Activities        TA 35 2   Gait Training          GT     Neuro Re-education NR     Modalities     Non-Billable Service Time     Other     Total Time/Units 35 2     Electronically signed by:   Gerald Miguel

## 2021-10-06 ENCOUNTER — HOSPITAL ENCOUNTER (OUTPATIENT)
Dept: PHYSICAL THERAPY | Age: 21
Setting detail: THERAPIES SERIES
Discharge: HOME | End: 2021-10-06
Payer: MEDICAID

## 2021-10-06 PROCEDURE — 97110 THERAPEUTIC EXERCISES: CPT

## 2021-10-06 NOTE — PROGRESS NOTES
[facility]  Phone: [phone] Fax: [phone]       Physical Therapy Daily Treatment Note  Date:  10/6/2021    Patient Name:  Roberta Smith    :  2000  MRN: [id number]    Restrictions/Precautions:    Diagnosis: Traumatic Amputation Left Lower Extremity (2020)   Treatment Diagnosis:    Insurance/Certification information: [address] [place] [address]   (96 units each 66712-eyihi ex, 73139-pqfsx act, 70969-FD re-ed, 97140-manual)  thru 10/31/2021  Referring Practitioner: Brayan Norman PA-C   Plan of care signed (Y/N):    Visit# / total visits:  7/8 weeks   Pain level: /10  Time In: 11:10       Time Out: 11:40         Subjective: Pt reports he has an appointment with prosthetist this afternoon regarding new open area. Exercises:  Exercise/Equipment Resistance/Repetitions Other comments   Sit to stand from 20\" box    2 x 5     Mirror for feedback   Standing wt shift   //bars                          nt              Tactile cue   LLE stand, R step fwd/bck 10 x                      nt Facilitate wt shift over LLE   RLE high/knee flex/ext 2 x 10                  nt   Facilitate wt on LLE   Side stepping  GTB 4 laps    Backward walking   4 laps                     nt      Hip extension      Standing hip abduction 2 x 15                         extension 2 x 15                    Seated on ball -static    1 min x 1, 2min x 1           Mini march 10 x B alt         Tband pull downs PTB 2 x 15                  rows PTB 2 x 15                                         Other Therapeutic Activities:              .  Standing balance:   Home Exercise Program: (2021) Instructed pt in standing hip abduction and extension to be performed daily at home.       Manual Treatments:      Modalities:      Timed Code Treatment Minutes: 30    Total Treatment Minutes:  30    Treatment/Activity Tolerance:  [x] Patient tolerated treatment well [] Patient limited by fatigue  [] Patient limited by pain  [] Patient limited by other medical complications  [x] Other: Today's session focused on left hip and trunk strengthening. Less walking today d/t new open area on L residual limb. Plan:   [x] Continue per plan of care [] Alter current plan (see comments)  [] Plan of care initiated [] Hold pending MD visit [] Discharge  Plan for Next Session:         Treatment Charges: Mins Units   Initial Evaluation     Re-Evaluation     Ther Exercise         TE     Manual Therapy     MT     Ther Activities        TA 30 2   Gait Training          GT     Neuro Re-education NR     Modalities     Non-Billable Service Time     Other     Total Time/Units 30 2     Electronically signed by:   Gerald Miguel

## 2021-10-11 ENCOUNTER — HOSPITAL ENCOUNTER (OUTPATIENT)
Dept: PHYSICAL THERAPY | Age: 21
Setting detail: THERAPIES SERIES
Discharge: HOME | End: 2021-10-11
Payer: MEDICAID

## 2021-10-11 PROCEDURE — 97530 THERAPEUTIC ACTIVITIES: CPT

## 2021-10-11 PROCEDURE — 97110 THERAPEUTIC EXERCISES: CPT

## 2021-10-11 NOTE — PROGRESS NOTES
[facility]  Phone: [phone] Fax: [phone]       Physical Therapy Daily Treatment Note  Date:  10/11/2021    Patient Name:  Leonie Smith    :  2000  MRN: [id number]    Restrictions/Precautions:    Diagnosis: Traumatic Amputation Left Lower Extremity (2020)   Treatment Diagnosis:    Insurance/Certification information: [address] [facility]   (96 units each 44312-mndom ex, 34661-jalsq act, 42766-TQ re-ed, 97140-manual)  thru 10/31/2021  Referring Practitioner: Amy Barton PA-C   Plan of care signed (Y/N):    Visit# / total visits:   weeks   Pain level: /10  Time In: 11:15       Time Out: 11:55         Subjective: Pt reports prosthetist further modified his socket allowing relief of pressure on anterior limb. Also states it was suggested he transition to a cane to facilitate increased weight on LLE. Exercises:  Exercise/Equipment Resistance/Repetitions Other comments   Sit to stand from 20\" box    2 x 5     Mirror for feedback   Standing wt shift   //bars                          nt              Tactile cue   LLE stand, R step fwd/bck 10 x                      nt Facilitate wt shift over LLE   RLE high/knee flex/ext 2 x 10                  nt   Facilitate wt on LLE   Side stepping  GTB 2 laps    Backward walking   4 laps                      Hip extension      Standing hip abduction 2 x 15                         extension 2 x 15                    Seated on ball -static    1 min x 1, 2min x 1 nt          Mini march 10 x B alt  nt       Tband pull downs PTB 2 x 15                  rows PTB 2 x 15                                         Other Therapeutic Activities:  Practiced gait mechanics in //bars with decreasing UE support using mirror for feedback. Cued fwd weight shift over LLE. Ambulated 225' w/one lofstrand crutch with focus on midline posture and fwd weight shift over LLE. Ambulated 20' x 3 using SPC and close SBA      .   Standing balance:   Home Exercise Program: (9/27/2021) Instructed pt in standing hip abduction and extension to be performed daily at home. Manual Treatments:      Modalities:      Timed Code Treatment Minutes: 35    Total Treatment Minutes:  40    Treatment/Activity Tolerance:  [x] Patient tolerated treatment well [] Patient limited by fatigue  [] Patient limited by pain  [] Patient limited by other medical complications  [x] Other: Pt had intermittent issues with suction of prosthesis during session today. After he adjusted x 2 this appeared to have been resolved. Pt demonstrated decreased lean to the right when amb w/SPC. Plan:   [x] Continue per plan of care [] Alter current plan (see comments)  [] Plan of care initiated [] Hold pending MD visit [] Discharge  Plan for Next Session:         Treatment Charges: Mins Units   Initial Evaluation     Re-Evaluation     Ther Exercise         TE     Manual Therapy     MT     Ther Activities        TA 35 2   Gait Training          GT     Neuro Re-education NR     Modalities     Non-Billable Service Time     Other 5    Total Time/Units 40 2     Electronically signed by:   Gerald Miguel

## 2021-10-13 ENCOUNTER — HOSPITAL ENCOUNTER (OUTPATIENT)
Dept: PHYSICAL THERAPY | Age: 21
Setting detail: THERAPIES SERIES
Discharge: HOME | End: 2021-10-13
Payer: MEDICAID

## 2021-10-13 PROCEDURE — 97530 THERAPEUTIC ACTIVITIES: CPT

## 2021-10-13 NOTE — PROGRESS NOTES
[facility]  Phone: [phone] Fax: [phone]       Physical Therapy Daily Treatment Note  Date:  10/13/2021    Patient Name:  Sangeeta Malcolm    :  2000  MRN: [id number]    Restrictions/Precautions:    Diagnosis: Traumatic Amputation Left Lower Extremity (2020)   Treatment Diagnosis:    Insurance/Certification information: [address] [facility]   (96 units each 89752-hthsa ex, 45162-bwnfx act, 15347-LD re-ed, 92726-PYRIVR)  thru 10/31/2021  Referring Practitioner: Luke Graves PA-C   Plan of care signed (Y/N):    Visit# / total visits:   weeks   Pain level: /10  Time In: 11:00       Time Out: 11:35         Subjective: Pt states he has an appointment with prosthetist this Friday to address another area of pressure from his leg. States he does not wear prosthesis to negotiate stairs at home and at times forgets to ascend a curb using RLE first.     Exercises:  Exercise/Equipment Resistance/Repetitions Other comments   Sit to stand from 20\" box    2 x 5     Mirror for feedback   Standing wt shift   //bars                          nt              Tactile cue   LLE stand, R step fwd/bck 10 x                       Facilitate wt shift over LLE   RLE high/knee flex/ext 2 x 10                  nt   Facilitate wt on LLE   Side stepping  GTB 4 laps    Backward walking   4 laps                  nt             Hip extension   Standing hip flexion 2 x 10    Standing hip abduction 2 x 15                       Seated on ball -static    1 min x 1, 2min x 1 nt          Mini march 10 x B alt         Tband pull downs PTB 2 x 15                  rows PTB 2 x 15           Standing trunk rot w/wand  7 x B                           Other Therapeutic Activities:  Practiced gait mechanics in //bars with decreasing UE support using mirror for feedback. Cued fwd weight shift over LLE. Ambulated 225' SPC and close SBA.   Negotiated curb outside using loftstrand crutch with verbal reminder for sequence. .  Standing balance:   Home Exercise Program: (9/27/2021) Instructed pt in standing hip abduction and extension to be performed daily at home. Manual Treatments:      Modalities:      Timed Code Treatment Minutes: 35    Total Treatment Minutes:  35    Treatment/Activity Tolerance:  [x] Patient tolerated treatment well [] Patient limited by fatigue  [] Patient limited by pain  [] Patient limited by other medical complications  [x] Other: Pt demonstrates better midline posture when amb w/SPC. 2 sl LOB which pt self corrected as he is adjusting to decreased support with SPC. Mild soreness in right shld which pt related to tensing muscles in effort to control the cane. Plan:   [x] Continue per plan of care [] Alter current plan (see comments)  [] Plan of care initiated [] Hold pending MD visit [] Discharge  Plan for Next Session:         Treatment Charges: Mins Units   Initial Evaluation     Re-Evaluation     Ther Exercise         TE     Manual Therapy     MT     Ther Activities        TA 35 2   Gait Training          GT     Neuro Re-education NR     Modalities     Non-Billable Service Time     Other     Total Time/Units 35 2     Electronically signed by:   Gerald Miguel

## 2021-10-18 ENCOUNTER — HOSPITAL ENCOUNTER (OUTPATIENT)
Dept: PHYSICAL THERAPY | Age: 21
Setting detail: THERAPIES SERIES
Discharge: HOME | End: 2021-10-18
Payer: MEDICAID

## 2021-10-18 PROCEDURE — 97530 THERAPEUTIC ACTIVITIES: CPT

## 2021-10-21 ENCOUNTER — HOSPITAL ENCOUNTER (OUTPATIENT)
Dept: PHYSICAL THERAPY | Age: 21
Setting detail: THERAPIES SERIES
Discharge: HOME | End: 2021-10-21
Payer: MEDICAID

## 2021-10-21 NOTE — FLOWSHEET NOTE
[facility]  Phone: [phone] Fax: [phone]     Physical Therapy  Cancellation/No-show Note  Patient Name:  Karla Sender  :  2000   Date:  10/21/2021    For today's appointment patient:  [x]  Cancelled  []  Rescheduled appointment  []  No-show     Reason given by patient:  []  Patient ill  []  Conflicting appointment  []  No transportation    []  Conflict with work  [x]  No reason given  []  Other:     Comments:      Electronically signed by:   Gerald Miguel

## 2021-10-25 ENCOUNTER — HOSPITAL ENCOUNTER (OUTPATIENT)
Dept: PHYSICAL THERAPY | Age: 21
Setting detail: THERAPIES SERIES
Discharge: HOME | End: 2021-10-25
Payer: MEDICAID

## 2021-10-25 PROCEDURE — 97530 THERAPEUTIC ACTIVITIES: CPT

## 2021-10-25 NOTE — PROGRESS NOTES
[facility]  Phone: [phone] Fax: [phone]       Physical Therapy Daily Treatment Note  Date:  10/25/2021    Patient Name:  Karla Sender    :  2000  MRN: [id number]    Restrictions/Precautions:    Diagnosis: Traumatic Amputation Left Lower Extremity (2020)   Treatment Diagnosis:    Insurance/Certification information: [address] [facility]   (96 units each 14971-cqfcm ex, 23205-gzbjq act, 18877-BE re-ed, 48421-SCNEPN)  thru 10/31/2021  Referring Practitioner: Guillermo Chase PA-C   Plan of care signed (Y/N):    Visit# / total visits:   weeks   Pain level: /10  Time In: 11:10      Time Out: 11:50         Subjective: Pt states the prosthetist has ordered something for him to wear over grafted area to protect it from rubbing in the socket. States he saw physician in [facility] for possible implanted prosthetic post however states he does not plan to proceed with this. Exercises:  Exercise/Equipment Resistance/Repetitions Other comments   Sit to stand from 20\" box 12 x     Standing wt shift   //bars                          nt              Tactile cue   LLE stand, R step fwd/bck 10 x                      nt Facilitate wt shift over LLE   RLE high/knee flex/ext 15 x               Facilitate wt on LLE   Side stepping  GTB 4 laps    Backward walking   4 laps                           Hip extension   Standing hip flexion 2 x 15    Standing hip abduction 2 x 15                       nt                                             Other Therapeutic Activities:  Session today focused on progression of ambulation to using a SPC. Ambulated 225' x 2 using SPC and close SBA. Pt ambulated 25' w/no AD. He continues to demonstrate increased deficits when not using a device. .      Standing balance:   Home Exercise Program: (2021) Instructed pt in standing hip abduction and extension to be performed daily at home.       Manual Treatments:      Modalities: Timed Code Treatment Minutes: 35    Total Treatment Minutes:  40    Treatment/Activity Tolerance:  [x] Patient tolerated treatment well [] Patient limited by fatigue  [] Patient limited by pain  [] Patient limited by other medical complications  [] Other:     Plan:   [x] Continue per plan of care [] Alter current plan (see comments)  [] Plan of care initiated [] Hold pending MD visit [] Discharge  Plan for Next Session:         Treatment Charges: Mins Units   Initial Evaluation     Re-Evaluation     Ther Exercise         TE     Manual Therapy     MT     Ther Activities        TA 35 2   Gait Training          GT     Neuro Re-education NR     Modalities     Non-Billable Service Time     Other 5    Total Time/Units 40 2     Electronically signed by:   Gerald Miguel

## 2023-04-26 ENCOUNTER — HOSPITAL ENCOUNTER (OUTPATIENT)
Dept: GENERAL RADIOLOGY | Age: 23
Discharge: HOME | End: 2023-04-28
Payer: MEDICAID

## 2023-04-26 ENCOUNTER — OFFICE VISIT (OUTPATIENT)
Dept: ORTHOPEDIC SURGERY | Age: 23
End: 2023-04-26
Payer: MEDICAID

## 2023-04-26 VITALS — HEIGHT: 72 IN | BODY MASS INDEX: 35.08 KG/M2 | WEIGHT: 259 LBS

## 2023-04-26 DIAGNOSIS — S88.912D TRAUMATIC AMPUTATION OF LEFT LOWER EXTREMITY, SUBSEQUENT ENCOUNTER (HCC): ICD-10-CM

## 2023-04-26 DIAGNOSIS — S88.912D TRAUMATIC AMPUTATION OF LEFT LOWER EXTREMITY, SUBSEQUENT ENCOUNTER (HCC): Primary | ICD-10-CM

## 2023-04-26 DIAGNOSIS — Z89.612 S/P AKA (ABOVE KNEE AMPUTATION), LEFT (HCC): ICD-10-CM

## 2023-04-26 DIAGNOSIS — Z89.612 S/P AKA (ABOVE KNEE AMPUTATION), LEFT (HCC): Primary | Chronic | ICD-10-CM

## 2023-04-26 PROCEDURE — 72190 X-RAY EXAM OF PELVIS: CPT

## 2023-04-26 PROCEDURE — 73552 X-RAY EXAM OF FEMUR 2/>: CPT

## 2023-04-26 PROCEDURE — 99212 OFFICE O/P EST SF 10 MIN: CPT | Performed by: ORTHOPAEDIC SURGERY

## 2023-08-14 NOTE — PROGRESS NOTES
Supervision and attendance of Hyperbaric Oxygen Therapy provided. Continue HBO treatment as outlined in the treatment plan. Hyperbaric Oxygen: Keegan Bailey tolerated Treatment Number: 28 well today without complications.     Discharge Instructions were explained and given to Mr. Danyel Lowe     Electronically signed by Aleta Muñoz MD on 11/24/2020 at 6:56 PM none

## 2024-03-02 PROCEDURE — 87502 INFLUENZA DNA AMP PROBE: CPT

## 2024-03-02 PROCEDURE — 87635 SARS-COV-2 COVID-19 AMP PRB: CPT

## 2024-03-02 PROCEDURE — 6370000000 HC RX 637 (ALT 250 FOR IP)

## 2024-03-02 PROCEDURE — 99284 EMERGENCY DEPT VISIT MOD MDM: CPT

## 2024-03-02 PROCEDURE — 96372 THER/PROPH/DIAG INJ SC/IM: CPT

## 2024-03-02 RX ORDER — IBUPROFEN 600 MG/1
600 TABLET ORAL ONCE
Status: COMPLETED | OUTPATIENT
Start: 2024-03-03 | End: 2024-03-02

## 2024-03-02 RX ORDER — ONDANSETRON 4 MG/1
4 TABLET, ORALLY DISINTEGRATING ORAL ONCE
Status: COMPLETED | OUTPATIENT
Start: 2024-03-03 | End: 2024-03-02

## 2024-03-02 RX ADMIN — IBUPROFEN 600 MG: 600 TABLET, FILM COATED ORAL at 23:59

## 2024-03-02 RX ADMIN — ONDANSETRON 4 MG: 4 TABLET, ORALLY DISINTEGRATING ORAL at 23:59

## 2024-03-02 ASSESSMENT — PAIN SCALES - GENERAL
PAINLEVEL_OUTOF10: 10
PAINLEVEL_OUTOF10: 10

## 2024-03-02 ASSESSMENT — PAIN DESCRIPTION - LOCATION: LOCATION: GENERALIZED;HEAD

## 2024-03-02 ASSESSMENT — PAIN - FUNCTIONAL ASSESSMENT: PAIN_FUNCTIONAL_ASSESSMENT: 0-10

## 2024-03-03 ENCOUNTER — HOSPITAL ENCOUNTER (EMERGENCY)
Age: 24
Discharge: HOME | End: 2024-03-03
Payer: MEDICAID

## 2024-03-03 VITALS
RESPIRATION RATE: 16 BRPM | SYSTOLIC BLOOD PRESSURE: 128 MMHG | BODY MASS INDEX: 33.86 KG/M2 | HEIGHT: 72 IN | OXYGEN SATURATION: 97 % | HEART RATE: 94 BPM | TEMPERATURE: 99.3 F | DIASTOLIC BLOOD PRESSURE: 76 MMHG | WEIGHT: 250 LBS

## 2024-03-03 DIAGNOSIS — J10.1 INFLUENZA B: Primary | ICD-10-CM

## 2024-03-03 DIAGNOSIS — B34.9 ACUTE VIRAL SYNDROME: ICD-10-CM

## 2024-03-03 DIAGNOSIS — U07.1 ACUTE COVID-19: ICD-10-CM

## 2024-03-03 LAB
INFLUENZA B BY PCR: DETECTED
SARS-COV-2 RDRP RESP QL NAA+PROBE: DETECTED
SPECIMEN DESCRIPTION: ABNORMAL

## 2024-03-03 PROCEDURE — 6370000000 HC RX 637 (ALT 250 FOR IP)

## 2024-03-03 PROCEDURE — 6360000002 HC RX W HCPCS

## 2024-03-03 PROCEDURE — 96372 THER/PROPH/DIAG INJ SC/IM: CPT

## 2024-03-03 RX ORDER — METOCLOPRAMIDE HYDROCHLORIDE 5 MG/ML
10 INJECTION INTRAMUSCULAR; INTRAVENOUS ONCE
Status: COMPLETED | OUTPATIENT
Start: 2024-03-03 | End: 2024-03-03

## 2024-03-03 RX ORDER — BROMPHENIRAMINE MALEATE, PSEUDOEPHEDRINE HYDROCHLORIDE, AND DEXTROMETHORPHAN HYDROBROMIDE 2; 30; 10 MG/5ML; MG/5ML; MG/5ML
5 SYRUP ORAL 4 TIMES DAILY PRN
Qty: 140 ML | Refills: 0 | Status: SHIPPED | OUTPATIENT
Start: 2024-03-03 | End: 2024-03-10

## 2024-03-03 RX ORDER — ONDANSETRON 4 MG/1
4 TABLET, ORALLY DISINTEGRATING ORAL 3 TIMES DAILY PRN
Qty: 21 TABLET | Refills: 0 | Status: SHIPPED | OUTPATIENT
Start: 2024-03-03

## 2024-03-03 RX ORDER — OSELTAMIVIR PHOSPHATE 75 MG/1
75 CAPSULE ORAL 2 TIMES DAILY
Qty: 10 CAPSULE | Refills: 0 | Status: SHIPPED | OUTPATIENT
Start: 2024-03-03 | End: 2024-03-08

## 2024-03-03 RX ORDER — IBUPROFEN 600 MG/1
600 TABLET ORAL EVERY 8 HOURS PRN
Qty: 30 TABLET | Refills: 0 | Status: SHIPPED | OUTPATIENT
Start: 2024-03-03

## 2024-03-03 RX ORDER — DIPHENHYDRAMINE HYDROCHLORIDE 50 MG/ML
25 INJECTION INTRAMUSCULAR; INTRAVENOUS ONCE
Status: COMPLETED | OUTPATIENT
Start: 2024-03-03 | End: 2024-03-03

## 2024-03-03 RX ORDER — ACETAMINOPHEN 325 MG/1
650 TABLET ORAL EVERY 8 HOURS PRN
Qty: 120 TABLET | Refills: 3 | Status: SHIPPED | OUTPATIENT
Start: 2024-03-03

## 2024-03-03 RX ORDER — FAMOTIDINE 20 MG/1
20 TABLET, FILM COATED ORAL ONCE
Status: COMPLETED | OUTPATIENT
Start: 2024-03-03 | End: 2024-03-03

## 2024-03-03 RX ADMIN — METOCLOPRAMIDE 10 MG: 5 INJECTION, SOLUTION INTRAMUSCULAR; INTRAVENOUS at 01:38

## 2024-03-03 RX ADMIN — DIPHENHYDRAMINE HYDROCHLORIDE 25 MG: 50 INJECTION, SOLUTION INTRAMUSCULAR; INTRAVENOUS at 01:37

## 2024-03-03 RX ADMIN — FAMOTIDINE 20 MG: 20 TABLET, FILM COATED ORAL at 01:38

## 2024-03-03 ASSESSMENT — PAIN - FUNCTIONAL ASSESSMENT: PAIN_FUNCTIONAL_ASSESSMENT: NONE - DENIES PAIN

## 2024-03-03 NOTE — ED PROVIDER NOTES
rigidity, normoactive bowel sounds.  No firm or pulsatile mass.  Integument:  Normal turgor.  No pallor, cyanosis, or icterus.  Hot to touch, dry, without visible rash.  Neurological:  Oriented x 4.  Motor functions intact.       Lab / Imaging Results   (All laboratory and radiology results have been personally reviewed by myself)  Labs:  Results for orders placed or performed during the hospital encounter of 03/03/24   COVID-19, Rapid    Specimen: Nasopharyngeal Swab; Nasal   Result Value Ref Range    Specimen Description .NASOPHARYNGEAL SWAB     SARS-CoV-2, Rapid DETECTED (A) Not Detected   Influenza A+B, PCR    Specimen: Nasal   Result Value Ref Range    Influenza A by PCR Not Detected Not Detected    Influenza B by PCR DETECTED (A) Not Detected       Imaging:  All Radiology results interpreted by Radiologist unless otherwise noted.  No orders to display       ED Course / Medical Decision Making     Medications   ibuprofen (ADVIL;MOTRIN) tablet 600 mg (600 mg Oral Given 3/2/24 2359)   ondansetron (ZOFRAN-ODT) disintegrating tablet 4 mg (4 mg Oral Given 3/2/24 2359)   famotidine (PEPCID) tablet 20 mg (20 mg Oral Given 3/3/24 0138)   metoclopramide (REGLAN) injection 10 mg (10 mg IntraMUSCular Given 3/3/24 0138)   diphenhydrAMINE (BENADRYL) injection 25 mg (25 mg IntraMUSCular Given 3/3/24 0137)          Vitals:    03/02/24 2338 03/03/24 0101 03/03/24 0157   BP: (!) 131/98 132/73 128/76   Pulse: (!) 114 (!) 106 94   Resp: 16 16 16   Temp: (!) 102.6 °F (39.2 °C) 100.1 °F (37.8 °C) 99.3 °F (37.4 °C)   TempSrc: Oral     SpO2: 99% 97% 97%   Weight: 113.4 kg (250 lb)     Height: 1.829 m (6')         Consults:   None    Procedures:   none    Medical Decision Making:   Keegan Winston is a 23 y.o. old male who presents to the emergency department by private vehicle, for evaluation of URI symptoms, which began 1 day(s) prior to arrival.  He complains of a fever as high as 102, generalized bodyaches, gradually worsening

## 2024-09-23 ENCOUNTER — TELEPHONE (OUTPATIENT)
Dept: ORTHOPEDIC SURGERY | Age: 24
End: 2024-09-23

## 2024-10-03 ENCOUNTER — HOSPITAL ENCOUNTER (EMERGENCY)
Age: 24
Discharge: HOME | End: 2024-10-03
Payer: MEDICAID

## 2024-10-03 VITALS
TEMPERATURE: 98.8 F | OXYGEN SATURATION: 99 % | DIASTOLIC BLOOD PRESSURE: 74 MMHG | HEIGHT: 72 IN | OXYGEN SATURATION: 99 % | HEART RATE: 74 BPM | HEART RATE: 74 BPM | DIASTOLIC BLOOD PRESSURE: 74 MMHG | SYSTOLIC BLOOD PRESSURE: 145 MMHG | RESPIRATION RATE: 14 BRPM | BODY MASS INDEX: 35.21 KG/M2 | SYSTOLIC BLOOD PRESSURE: 145 MMHG | RESPIRATION RATE: 14 BRPM | HEIGHT: 72 IN | WEIGHT: 260 LBS | TEMPERATURE: 98.8 F | WEIGHT: 260 LBS | BODY MASS INDEX: 35.21 KG/M2

## 2024-10-03 DIAGNOSIS — L02.91 ABSCESS: Primary | ICD-10-CM

## 2024-10-03 PROCEDURE — 87070 CULTURE OTHR SPECIMN AEROBIC: CPT

## 2024-10-03 PROCEDURE — 6370000000 HC RX 637 (ALT 250 FOR IP)

## 2024-10-03 PROCEDURE — 87077 CULTURE AEROBIC IDENTIFY: CPT

## 2024-10-03 PROCEDURE — 99283 EMERGENCY DEPT VISIT LOW MDM: CPT

## 2024-10-03 PROCEDURE — 87205 SMEAR GRAM STAIN: CPT

## 2024-10-03 PROCEDURE — 10060 I&D ABSCESS SIMPLE/SINGLE: CPT

## 2024-10-03 RX ORDER — DOXYCYCLINE HYCLATE 100 MG
100 TABLET ORAL 2 TIMES DAILY
Qty: 14 TABLET | Refills: 0 | Status: SHIPPED | OUTPATIENT
Start: 2024-10-03 | End: 2024-10-10

## 2024-10-03 RX ORDER — CEPHALEXIN 500 MG/1
500 CAPSULE ORAL 4 TIMES DAILY
Qty: 28 CAPSULE | Refills: 0 | Status: SHIPPED | OUTPATIENT
Start: 2024-10-03 | End: 2024-10-10

## 2024-10-03 RX ORDER — CEPHALEXIN 500 MG/1
500 CAPSULE ORAL ONCE
Status: COMPLETED | OUTPATIENT
Start: 2024-10-03 | End: 2024-10-03

## 2024-10-03 RX ORDER — DOXYCYCLINE 100 MG/1
100 CAPSULE ORAL ONCE
Status: COMPLETED | OUTPATIENT
Start: 2024-10-03 | End: 2024-10-03

## 2024-10-03 RX ORDER — LIDOCAINE HYDROCHLORIDE 10 MG/ML
5 INJECTION, SOLUTION INFILTRATION; PERINEURAL ONCE
Status: DISCONTINUED | OUTPATIENT
Start: 2024-10-03 | End: 2024-10-03 | Stop reason: HOSPADM

## 2024-10-03 RX ADMIN — DOXYCYCLINE HYCLATE 100 MG: 100 CAPSULE ORAL at 18:02

## 2024-10-03 RX ADMIN — CEPHALEXIN 500 MG: 500 CAPSULE ORAL at 18:02

## 2024-10-03 ASSESSMENT — PAIN - FUNCTIONAL ASSESSMENT: PAIN_FUNCTIONAL_ASSESSMENT: NONE - DENIES PAIN
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
## 2024-10-04 ASSESSMENT — ENCOUNTER SYMPTOMS
GASTROINTESTINAL NEGATIVE: 1
ALLERGIC/IMMUNOLOGIC NEGATIVE: 1
EYES NEGATIVE: 1
RESPIRATORY NEGATIVE: 1

## 2024-10-06 LAB
MICROORGANISM SPEC CULT: ABNORMAL
MICROORGANISM SPEC CULT: ABNORMAL
MICROORGANISM/AGENT SPEC: ABNORMAL
SPECIMEN DESCRIPTION: ABNORMAL
SPECIMEN DESCRIPTION: ABNORMAL

## (undated) DEVICE — SET ORTHO STD STORTSTD2

## (undated) DEVICE — PATIENT RETURN ELECTRODE, SINGLE-USE, CONTACT QUALITY MONITORING, ADULT, WITH 9FT CORD, FOR PATIENTS WEIGING OVER 33LBS. (15KG): Brand: MEGADYNE

## (undated) DEVICE — HEWSON SUTURE RETRIEVER: Brand: HEWSON SUTURE RETRIEVER

## (undated) DEVICE — HANDPIECE SET WITH BONE CLEANING TIP AND SUCTION TUBE: Brand: INTERPULSE

## (undated) DEVICE — GOWN,AURORA,BRTHSLV,2XL,18/CS: Brand: MEDLINE

## (undated) DEVICE — DRESSING PETRO W3XL8IN OIL EMUL N ADH GZ KNIT IMPREG CELOS

## (undated) DEVICE — TOTAL KNEE PK

## (undated) DEVICE — GLOVE SURG SZ 8 L12IN FNGR THK79MIL GRN LTX FREE

## (undated) DEVICE — SYRINGE MED 10ML POLYPR LUERSLIP TIP FLAT TOP W/O SFTY DISP

## (undated) DEVICE — Z DISCONTINUED USE 2275686 GLOVE SURG SZ 8 L12IN FNGR THK13MIL WHT ISOLEX POLYISOPRENE

## (undated) DEVICE — IMPLANTABLE DEVICE
Type: IMPLANTABLE DEVICE | Site: PELVIS | Status: NON-FUNCTIONAL
Removed: 2020-09-28

## (undated) DEVICE — SPONGE GZ W4XL4IN COT 12 PLY TYP VII WVN C FLD DSGN

## (undated) DEVICE — BANDAGE,GAUZE,BULKEE II,2.25"X3YD,STRL: Brand: MEDLINE

## (undated) DEVICE — SET INSTR TRACH

## (undated) DEVICE — 3M™ IOBAN™ 2 ANTIMICROBIAL INCISE DRAPE 6640EZ: Brand: IOBAN™ 2

## (undated) DEVICE — CONNECTOR NEG PRSS WHT PLAS Y DISP

## (undated) DEVICE — PAD,NON-ADHERENT,3X8,STERILE,LF,1/PK: Brand: MEDLINE

## (undated) DEVICE — DRIP REDUCTION MANIFOLD

## (undated) DEVICE — 3M™ STERI-DRAPE™ U-DRAPE 1015: Brand: STERI-DRAPE™

## (undated) DEVICE — GOWN,SIRUS,FABRNF,XL,20/CS: Brand: MEDLINE

## (undated) DEVICE — SYRINGE MED 10ML LUERLOCK TIP W/O SFTY DISP

## (undated) DEVICE — Z INACTIVE USE 2660664 SOLUTION IRRIG 3000ML 0.9% SOD CHL USP UROMATIC PLAS CONT

## (undated) DEVICE — SOLUTION IV IRRIG POUR BRL 0.9% SODIUM CHL 2F7124

## (undated) DEVICE — SET IRR L100IN DIA0.280IN C100ML 2 NVENT PIERCING PINS 3

## (undated) DEVICE — READY WET SKIN SCRUB TRAY-LF: Brand: MEDLINE INDUSTRIES, INC.

## (undated) DEVICE — SURGICAL PROCEDURE PACK BASIC

## (undated) DEVICE — INTENDED FOR TISSUE SEPARATION, AND OTHER PROCEDURES THAT REQUIRE A SHARP SURGICAL BLADE TO PUNCTURE OR CUT.: Brand: BARD-PARKER ® STAINLESS STEEL BLADES

## (undated) DEVICE — JELLY,LUBE,STERILE,FLIP TOP,TUBE,4-OZ: Brand: MEDLINE

## (undated) DEVICE — CLOTH SURG PREP PREOPERATIVE CHLORHEXIDINE GLUC 2% READYPREP

## (undated) DEVICE — KIT DSG LRG NEG PRSS WND THER VAC GRANUFOAM

## (undated) DEVICE — GLOVE ORANGE PI 7 1/2   MSG9075

## (undated) DEVICE — ZIMMER® STERILE DISPOSABLE TOURNIQUET CUFF WITH PLC, DUAL PORT, SINGLE BLADDER, 18 IN. (46 CM)

## (undated) DEVICE — DOUBLE BASIN SET: Brand: MEDLINE INDUSTRIES, INC.

## (undated) DEVICE — DRILL SYSTEM 7

## (undated) DEVICE — Device

## (undated) DEVICE — ZIMMER® STERILE DISPOSABLE TOURNIQUET CUFF WITH PROTECTIVE SLEEVE AND PLC, DUAL PORT, SINGLE BLADDER, 24 IN. (61 CM)

## (undated) DEVICE — GUIDEWIRE ORTH DIA2.8MM 300/150MM CALIB W/ FLUT FOR 6.5MM

## (undated) DEVICE — CANISTER NEG PRSS 1000ML W/ GEL INFOVAC

## (undated) DEVICE — 18 GA N.G. KIT, 10 PACK: Brand: SITE-RITE

## (undated) DEVICE — ROD RMR L950MM DIA2.5MM W/ EXTN BALL TIP

## (undated) DEVICE — 1000 S-DRAPE TOWEL DRAPE 10/BX: Brand: STERI-DRAPE™

## (undated) DEVICE — PADDING UNDERCAST W4INXL4YD COT FBR LO LINTING WYTEX

## (undated) DEVICE — TOWEL,OR,DSP,ST,BLUE,DLX,10/PK,8PK/CS: Brand: MEDLINE

## (undated) DEVICE — SHEET,DRAPE,53X77,STERILE: Brand: MEDLINE

## (undated) DEVICE — BANDAGE COMPR W4INXL10YD WHITE/BEIGE E MTRX HK LOOP CLSR

## (undated) DEVICE — PADDING,UNDERCAST,COTTON, 4"X4YD STERILE: Brand: MEDLINE

## (undated) DEVICE — YANKAUER,OPEN TIP,W/O VENT,STERILE: Brand: MEDLINE INDUSTRIES, INC.

## (undated) DEVICE — DRESSING,GAUZE,XEROFORM,CURAD,5"X9",ST: Brand: CURAD

## (undated) DEVICE — SPONGE LAP W18XL18IN WHT COT 4 PLY FLD STRUNG RADPQ DISP ST

## (undated) DEVICE — APPLICATOR MEDICATED 26 CC SOLUTION HI LT ORNG CHLORAPREP

## (undated) DEVICE — TTL1LYR 16FR10ML 100%SIL TMPST TR: Brand: MEDLINE

## (undated) DEVICE — SYSTEM TPS ORTHO

## (undated) DEVICE — BLADE, TONGUE, 6", STERILE: Brand: MEDLINE

## (undated) DEVICE — ROD EXT FIX L300MM DIA11MM C FBR MR CONDITIONAL

## (undated) DEVICE — ROD EXT FIX L250MM DIA11MM C FBR MR CONDITIONAL

## (undated) DEVICE — SET INSTRUMENT LAP I

## (undated) DEVICE — GOWN,BREATHABLE,IMP SLV 3XL AURORA: Brand: MEDLINE

## (undated) DEVICE — GUIDEWIRE ORTH L400MM DIA3.2MM FOR TFN

## (undated) DEVICE — CONVERTORS STOCKINETTE: Brand: CONVERTORS

## (undated) DEVICE — PAD,ABDOMINAL,5"X9",ST,LF,25/BX: Brand: MEDLINE INDUSTRIES, INC.

## (undated) DEVICE — PACK,HEAVY DRAINAGE,STERILE: Brand: MEDLINE INDUSTRIES, INC.

## (undated) DEVICE — CLAMP EXT FIX L TI ALLY COMB CLP ON SELF HLD

## (undated) DEVICE — PADDING CAST W6INXL4YD COT LO LINTING WYTEX

## (undated) DEVICE — SET ORTHO STD STORTSTD1

## (undated) DEVICE — BLADE CLIPPER GEN PURP NS

## (undated) DEVICE — DRAPE C ARM W41XL74IN UNIV MOB W RUBBERBAND CLP

## (undated) DEVICE — NEEDLE HYPO 25GA L1.5IN BLU POLYPR HUB S STL REG BVL STR

## (undated) DEVICE — VALVE SUCTION AIR H2O SET ORCA POD + DISP

## (undated) DEVICE — SURGICAL PROCEDURE PACK VASC MAJ CUST

## (undated) DEVICE — DRESSING COMP W4XL4IN N ADH PD W2.5XL2.5IN GZ BORDERED ADH

## (undated) DEVICE — BIT DRL L145MM DIA4.2MM ST 3 FLUT NDL PNT QUIK CPL FOR FEM

## (undated) DEVICE — GLOVE SURG SZ 65 THK91MIL LTX FREE SYN POLYISOPRENE

## (undated) DEVICE — CATHETER ETER SUCT 14FR RED POLYPR STR OPN W VLV

## (undated) DEVICE — BANDAGE COMPR W6INXL10YD ST M E WHITE/BEIGE

## (undated) DEVICE — STERILE POLYISOPRENE POWDER-FREE SURGICAL GLOVES: Brand: PROTEXIS

## (undated) DEVICE — DRESSING WND VAC MED GRANUFOAM SENSATRAC

## (undated) DEVICE — ANTISEPTIC 16OZ H PEROX 1ST AID ORAL DEBRIDING AGNT

## (undated) DEVICE — GOWN,BREATHABLE SLV,AURORA,XLG,STRL: Brand: MEDLINE

## (undated) DEVICE — BASIC SINGLE BASIN 1-LF: Brand: MEDLINE INDUSTRIES, INC.

## (undated) DEVICE — STOCKINETTE,DOUBLE PLY,6X48,STERILE: Brand: MEDLINE

## (undated) DEVICE — BANDAGE,GAUZE,4.5"X4.1YD,STERILE,LF: Brand: MEDLINE

## (undated) DEVICE — GAUZE,SPONGE,AVANT,4"X4",4PLY,STRL,10/TR: Brand: MEDLINE

## (undated) DEVICE — SOLUTION SURG PREP ANTIMICROBIAL 4 OZ SKIN WND EXIDINE

## (undated) DEVICE — VAC Y-CONNECTOR

## (undated) DEVICE — ADAPTER TBNG DIA15MM SWVL FBROPT BRONCHSCP TERM 2 AXIS PEEP

## (undated) DEVICE — DRAPE 24X23IN RADIOLOGY CASS ADHES STRIP

## (undated) DEVICE — CLEANER,CAUTERY TIP,2X2",STERILE: Brand: MEDLINE

## (undated) DEVICE — BNDG,ELSTC,MATRIX,STRL,6"X5YD,LF,HOOK&LP: Brand: MEDLINE

## (undated) DEVICE — 1.5 TO 1 DERMACARRIER: Brand: MESHGRAFTTM  II TISSUE EXPANSION SYSTEM

## (undated) DEVICE — KIT NEG PRSS M W12.5XH3.2XL18CM 2 SHT STD DRP 1 SENSATRAC

## (undated) DEVICE — SUTURE FIBERLOOP 4-0 L10IN NONABSORBABLE BLU L17.9MM 3/8 AR722920

## (undated) DEVICE — DRAPE SHEET: Brand: UNBRANDED

## (undated) DEVICE — BIT DRL L330MM DIA4.2MM CALIB L100MM ST 3 FLUT QUIK CPL FOR

## (undated) DEVICE — SEALER TISS L20CM DIA13MM ADV BPLR L CRV JAW OPN APPRCH

## (undated) DEVICE — DRESSING ADH N ADH 8X35 IN 6X175 IN SFT CLTH MEDIPORE +

## (undated) DEVICE — DRAPE THER FLUID WARMING 66X44 IN FLAT SLUSH DBL DISC ORS

## (undated) DEVICE — SOLUTION IV IRRIG 500ML 0.9% SODIUM CHL 2F7123

## (undated) DEVICE — DRAPE NEG PRSS W30.5XL26CM POLYUR ADH VAC

## (undated) DEVICE — SURGICAL PROCEDURE PACK HND

## (undated) DEVICE — GLOVE ORANGE PI 8   MSG9080

## (undated) DEVICE — TUBING, SUCTION, 3/16" X 12', STRAIGHT: Brand: MEDLINE

## (undated) DEVICE — SET SURG INSTR MINI VASC

## (undated) DEVICE — GLOVE SURG SZ 75 STD WHT LTX SYN POLYMER BEAD REINF ANTI RL

## (undated) DEVICE — DRAPE C ARM UNIV W41XL74IN CLR PLAS XR VELC CLSR POLY STRP

## (undated) DEVICE — SYRINGE MED 10ML TRNSLUC BRL PLUNG BLK MRK POLYPR CTRL

## (undated) DEVICE — STRYKER PERFORMANCE SERIES SAGITTAL BLADE: Brand: STRYKER PERFORMANCE SERIES

## (undated) DEVICE — TOWEL,OR,DSP,ST,BLUE,STD,6/PK,12PK/CS: Brand: MEDLINE

## (undated) DEVICE — SWAB SPEC COLL SHFT L5.25IN POLYUR FOAM TIP SFT DBL MEDIA

## (undated) DEVICE — PACK,UNIV, II AURORA: Brand: MEDLINE

## (undated) DEVICE — TRAY VCF/TESIO TRAY  REUSABLE

## (undated) DEVICE — Z DUP USE 2257490 ADHESIVE SKIN CLSRE 036ML TPCL 2CTL CNCRLTE HIGH VSCSTY DRMB

## (undated) DEVICE — SET THORACIC I

## (undated) DEVICE — STAPLER SKIN L39MM DIA0.53MM CRWN 5.7MM S STL FIX HD PROX

## (undated) DEVICE — ZINACTIVE SUPPLIER CLOSED SOLUTION SURG SCRB POVIDONE IOD BDINE

## (undated) DEVICE — SET INSTRUMENT MINOR PLASTICS

## (undated) DEVICE — SPONGE,PEANUT,XRAY,ST,SM,3/8",5/CARD: Brand: MEDLINE INDUSTRIES, INC.

## (undated) DEVICE — DRESSING NEG PRSS L 26X15X3.2CM W/ 1 PD W/ CONN 2 SHT STD

## (undated) DEVICE — BIT DRL DIA14MM CANN FLX FOR L QUIK CPL

## (undated) DEVICE — HYPODERMIC SAFETY NEEDLE: Brand: MAGELLAN

## (undated) DEVICE — 1.5L THIN WALL CAN: Brand: CRD

## (undated) DEVICE — CONTAINER VACUTAINER ANAER CULTURE SWAB

## (undated) DEVICE — GAUZE,SPONGE,4"X4",12PLY,STERILE,LF,2'S: Brand: MEDLINE

## (undated) DEVICE — GAUZE,SPONGE,POST-OP,4X3,STRL,LF: Brand: MEDLINE

## (undated) DEVICE — RELOAD STPL L75MM OPN H3.8MM CLS 1.5MM WIRE DIA0.2MM REG

## (undated) DEVICE — COVER,LIGHT HANDLE,FLX,2/PK: Brand: MEDLINE INDUSTRIES, INC.

## (undated) DEVICE — MASTISOL ADHESIVE LIQ 2/3ML

## (undated) DEVICE — INSTRUMENT SYSTEM 4 BATTERY REUSABLE

## (undated) DEVICE — 4-PORT MANIFOLD: Brand: NEPTUNE 2

## (undated) DEVICE — CANISTER NEG PRSS L 100ML GRAD OPN ABD THER ABTHERA

## (undated) DEVICE — GLOVE ORANGE PI 8 1/2   MSG9085

## (undated) DEVICE — GRADUATE

## (undated) DEVICE — DRAPE,REIN 53X77,STERILE: Brand: MEDLINE

## (undated) DEVICE — TRAP,MUCUS SPECIMEN,40CC: Brand: MEDLINE

## (undated) DEVICE — ELECTROSURGICAL PENCIL BUTTON SWITCH E-Z CLEAN COATED BLADE ELECTRODE 10 FT (3 M) CORD HOLSTER: Brand: MEGADYNE

## (undated) DEVICE — TUBING SUCT 12FR MAL ALUM SHFT FN CAP VENT UNIV CONN W/ OBT

## (undated) DEVICE — SET INSTRUMENT LAP II

## (undated) DEVICE — DRESSING ALG W0.75XL18IN REINF GEL FBR CHYTOFORM TECHNOLOGY

## (undated) DEVICE — NEEDLE HYPO 18GA L1.5IN PNK POLYPR HUB S STL REG BVL STR

## (undated) DEVICE — STANDARD HYPODERMIC NEEDLE,ALUMINUM HUB: Brand: MONOJECT

## (undated) DEVICE — BANDAGE COMPR W6INXL12FT SMOOTH FOR LIMB EXSANG ESMARCH

## (undated) DEVICE — SET THORACIC II

## (undated) DEVICE — KIT SURG PREP POVIDONE IOD PRESATURATED PAINT WET FOR UNIV

## (undated) DEVICE — STAPLER INT L75MM CUT LN L73MM STPL LN L77MM BLU B FRM 8

## (undated) DEVICE — TRAY SET HAND REUSABLE

## (undated) DEVICE — SHEET, T, LAPAROTOMY, STERILE: Brand: MEDLINE

## (undated) DEVICE — DRESSING TRNSPAR W8XL12IN FLM SURESITE 123

## (undated) DEVICE — SURGICAL PROCEDURE PACK EENT CUST

## (undated) DEVICE — SYRINGE 20ML LL S/C 50

## (undated) DEVICE — DERMATOME BLADES: Brand: DERMATOME

## (undated) DEVICE — BITEBLOCK 54FR W/ DENT RIM BLOX

## (undated) DEVICE — Z DUP USE 2612868 DRESSING FOAM W3.5XL3.5IN PNK HYDROCELLULAR NONADHESIVE KEY

## (undated) DEVICE — GAUZE,SPONGE,4"X4",8PLY,STRL,LF,10/TRAY: Brand: MEDLINE

## (undated) DEVICE — MAGNETIC INSTR DRAPE 20X16: Brand: MEDLINE INDUSTRIES, INC.

## (undated) DEVICE — CATHETER HAD ADMIN SET LNG TERM PRECRV W/ SIDE H

## (undated) DEVICE — TOTAL HIP PK

## (undated) DEVICE — LABEL MED 4 IN SURG PANEL W/ PEN STRL

## (undated) DEVICE — SURGICAL PROCEDURE PACK TRAUM

## (undated) DEVICE — TRAY DRILL SYSTEM 4 REUSABLE

## (undated) DEVICE — 3M™ MEDIPORE™ + PAD 3564: Brand: 3M™ MEDIPORE™

## (undated) DEVICE — 3M™ IOBAN™ 2 ANTIMICROBIAL INCISE DRAPE 6650EZ: Brand: IOBAN™ 2

## (undated) DEVICE — NEEDLE HYPO 21GA L1.5IN GRN POLYPR HUB S STL REG BVL STR

## (undated) DEVICE — SURGICAL PROCEDURE PACK BRONCH

## (undated) DEVICE — DRAPE 33X23IN INCISE ANTIMICROB IOBAN 2

## (undated) DEVICE — GOWN,SIRUS,FABRNF,L,20/CS: Brand: MEDLINE

## (undated) DEVICE — PAD DSG NEG PRSS W/ TBNG CLMP CONN SENSATRAC VAC

## (undated) DEVICE — GENERATOR ELECSURG FORCETRAID